# Patient Record
Sex: FEMALE | Race: WHITE | NOT HISPANIC OR LATINO | Employment: FULL TIME | URBAN - METROPOLITAN AREA
[De-identification: names, ages, dates, MRNs, and addresses within clinical notes are randomized per-mention and may not be internally consistent; named-entity substitution may affect disease eponyms.]

---

## 2023-03-10 LAB
ABO GROUP (TYPE) IN BLOOD: NORMAL
ANTIBODY SCREEN: NORMAL
RH FACTOR: NORMAL

## 2023-03-19 LAB — SARS-COV-2 RESULT: NOT DETECTED

## 2023-07-06 LAB
ABO GROUP (TYPE) IN BLOOD: NORMAL
ABO GROUP (TYPE) IN BLOOD: NORMAL
ANTIBODY SCREEN: NORMAL
ANTIBODY SCREEN: NORMAL
RH FACTOR: NORMAL
RH FACTOR: NORMAL

## 2023-07-11 ENCOUNTER — HOSPITAL ENCOUNTER (OUTPATIENT)
Dept: DATA CONVERSION | Facility: HOSPITAL | Age: 68
End: 2023-07-11
Attending: ORTHOPAEDIC SURGERY | Admitting: ORTHOPAEDIC SURGERY
Payer: COMMERCIAL

## 2023-07-11 DIAGNOSIS — S63.285A DISLOCATION OF PROXIMAL INTERPHALANGEAL JOINT OF LEFT RING FINGER, INITIAL ENCOUNTER: ICD-10-CM

## 2023-09-29 VITALS — HEIGHT: 68 IN | WEIGHT: 174.16 LBS | BODY MASS INDEX: 26.4 KG/M2

## 2023-10-02 NOTE — OP NOTE
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, S63.271A    Surgeon: Jeimy Romano  Resident/Fellow/Other Assistant: Lois Padgett    Procedure:  Left open reduction and pinning chronic proximal interphalangeal joint dislocation ring finger  Left central slip tenolysis ring finger  Left volar plate release and capsulotomy proximal interphalangeal joint ring finger    Anesthesia: Missael Ortega  Estimated Blood Loss: 0  Findings: Chronic contracture with contracture of soft tissues volar plate and tendons, degenerative changes articular surface proximal phalanx and middle phalanx  Specimens(s) Collected: no,     Complications: None  IV Fluids: 200cc  Implants: 0.045 K wire  Tourniquet Times: Utilized  Patient Returned To/Condition: Stable        Operative Report:   Clinical note:    Patient comes in as a 68-year-old she is unfortunately status post a chronic dislocation of the ring finger.  The patient had this occur back in November.  The patient never really failed to share that information with anybody.  In fact she went several  months and then given underwent a elective total hip.  Only in follow-up after the total hip did she mention that she had an issue with the ring finger and subsequently referred to me for definitive management.  Unfortunate the patient has a completely  dislocated and a shortened ring finger that is locked at the PIP joint.  Secondarily she is now locked literally at the DIP joint and at the MCP joint very little motion deformities appreciable the swelling is down.  I discussed options with her.  We  can proceed with our efforts to reduce this this will take considerable effort.  Unfortunately if we reduce it the patient may have arthritis stiffness and continued instability.  She understands  what to take down a significant amount of tissues in order  to successfully get it reduced.  The patient understands that down the line she may require further procedures which could include an arthrodesis or joint replacement.  The patient consents and is marked.    Operative note:    Patient taken the op room and anesthesia was administered.  The extremities prepped and draped in usual manner.  A final timeout is done with the  operative team.  The patient did hand have an antibiotic given.  Incision is carried out first volarly.  A Brijesh incision is carried out the flaps are meticulously elevated.  1 can appreciate prominence volarly.  Therefore preserving the pulley structures  portion of the cruciate is removed.  The volar plate is completely disrupted and the joint is practically exposed.  Unfortunately the area is extremely stiff and just releasing the volar plate was not enough to get it reduced.  Therefore dorsal incision  was then carried out.  Leaving the volar incision Ocean open the dorsal incision was carried out directly over the dislocation.  I did take down significant mount of scar tissue over the DIP joint once that was done I was able to identify the dorsal aspect  of the middle phalanx.  This is taken of the articular surface.  Unfortunately in this process 1 can appreciate the patient really has no significant cartilage it over the distal aspect of the proximal phalanx despite its contour looking okay the cartilage  completely was degenerative at and missing.  We turned our attention back fully once we released the central slip and the extensor tendons.  This allowed some mobility.  Through a dorsal and volar approach with all that released I was able to reduce the  joint.  Unfortunately once I release the joint the joint stayed relatively stable however clearly did not have a good articular surface.  We can appreciate this both clinically and radiographically.  Therefore she was pinned in  about 20 degrees let the  tissue scar in.  Our hope is therefore when the pin is removed we get her a little bit of motion via therapy although I am not very optimistic that will be painful or arthritic or require further intervention down the line.  The least this will maintain  the tissues the appropriate depth.  The DIP joint has not moved in about 8 months and therefore it was stretch without tearing the extensor tendon and part of the release dorsally needed that.  The tendons allowed to fall back into position volarly.   The volar plate was reconstructed as best possible distally and its attachment where was released.  Tendons freely flowed.  Wound was irrigated and both incisions were closed with nylon sutures.  Marcaine without epinephrine is infiltrated the areas for  postoperative pain relief.  The pin was cut and bent away from the skin for later retrieval.  Dressings include Xeroform fluffs web roll and a well-padded dressing.  Patient tolerated procedure well without complication.                        Attestation:   Note Completion:  Attending Attestation I performed the procedure without a resident         Electronic Signatures:  Jeimy Romano)  (Signed 11-Jul-2023 17:48)   Authored: Post-Operative Note, Chart Review, Note Completion      Last Updated: 11-Jul-2023 17:48 by Jeimy Romano)

## 2024-05-08 ENCOUNTER — LAB (OUTPATIENT)
Dept: LAB | Facility: LAB | Age: 69
End: 2024-05-08
Payer: MEDICARE

## 2024-05-08 ENCOUNTER — PRE-ADMISSION TESTING (OUTPATIENT)
Dept: PREADMISSION TESTING | Facility: HOSPITAL | Age: 69
End: 2024-05-08
Payer: MEDICARE

## 2024-05-08 VITALS — HEIGHT: 68 IN | WEIGHT: 181.88 LBS | BODY MASS INDEX: 27.57 KG/M2

## 2024-05-08 DIAGNOSIS — Z01.818 PRE-OP TESTING: ICD-10-CM

## 2024-05-08 DIAGNOSIS — Z01.818 PRE-OP TESTING: Primary | ICD-10-CM

## 2024-05-08 DIAGNOSIS — R73.9 TRANSIENT HYPERGLYCEMIA POST PROCEDURE: ICD-10-CM

## 2024-05-08 LAB
ABO GROUP (TYPE) IN BLOOD: NORMAL
ANTIBODY SCREEN: NORMAL
EST. AVERAGE GLUCOSE BLD GHB EST-MCNC: 111 MG/DL
HBA1C MFR BLD: 5.5 %
RH FACTOR (ANTIGEN D): NORMAL

## 2024-05-08 PROCEDURE — 86900 BLOOD TYPING SEROLOGIC ABO: CPT

## 2024-05-08 PROCEDURE — 83036 HEMOGLOBIN GLYCOSYLATED A1C: CPT

## 2024-05-08 PROCEDURE — 86901 BLOOD TYPING SEROLOGIC RH(D): CPT

## 2024-05-08 PROCEDURE — 87081 CULTURE SCREEN ONLY: CPT | Mod: STJLAB | Performed by: ORTHOPAEDIC SURGERY

## 2024-05-08 PROCEDURE — 36415 COLL VENOUS BLD VENIPUNCTURE: CPT

## 2024-05-08 PROCEDURE — 86850 RBC ANTIBODY SCREEN: CPT

## 2024-05-08 RX ORDER — GLUCOSAMINE/CHONDRO SU A 500-400 MG
1 TABLET ORAL 2 TIMES DAILY
COMMUNITY
End: 2024-05-23 | Stop reason: HOSPADM

## 2024-05-08 RX ORDER — BISMUTH SUBSALICYLATE 262 MG
1 TABLET,CHEWABLE ORAL DAILY
COMMUNITY

## 2024-05-08 RX ORDER — CHLORHEXIDINE GLUCONATE ORAL RINSE 1.2 MG/ML
SOLUTION DENTAL
Qty: 473 ML | Refills: 0 | Status: SHIPPED | OUTPATIENT
Start: 2024-05-08 | End: 2024-05-23 | Stop reason: HOSPADM

## 2024-05-08 RX ORDER — MELOXICAM 15 MG/1
15 TABLET ORAL DAILY PRN
COMMUNITY
End: 2024-05-23 | Stop reason: HOSPADM

## 2024-05-08 NOTE — PREPROCEDURE INSTRUCTIONS
Medication List            Accurate as of May 8, 2024  9:22 AM. Always use your most recent med list.                chlorhexidine 0.12 % solution  Commonly known as: Peridex  Sig: 15 mls swish and spit the night before surgery and 15mls swish and spit the morning of surgery do not swallow  Notes to patient: Take as instructed     COLLAGEN SKIN RENEWAL ORAL  Medication Adjustments for Surgery: Stop 7 days before surgery     glucosamine-chondroitin 500-400 mg tablet  Medication Adjustments for Surgery: Stop 7 days before surgery     LUTEIN ORAL  Medication Adjustments for Surgery: Stop 7 days before surgery     meloxicam 15 mg tablet  Commonly known as: Mobic  Medication Adjustments for Surgery: Stop 7 days before surgery     multivitamin tablet  Medication Adjustments for Surgery: Stop 7 days before surgery     PRESERVISION AREDS-2 ORAL  Medication Adjustments for Surgery: Stop 7 days before surgery                                  PRE-OPERATIVE INSTRUCTIONS    You will receive notification one business day prior to your procedure to confirm your arrival time. It is important that you answer your phone and/or check your messages during this time. If you do not hear from the surgery center by 5 pm. the day before your procedure, please call [phone].     Please enter the building through the Outpatient entrance and take the elevator off the lobby to the 2nd floor then check in at the Outpatient Surgery desk on the 2nd floor.    INSTRUCTIONS:  Talk to your surgeon for instructions if you should stop your aspirin, blood thinner, or diabetes medicines.  DO NOT take any multivitamins or over the counter supplements for 7-10 days before surgery.  If not being admitted, you must have an adult immediately available to drive you home after surgery. We also highly recommend you have someone stay with you for the entire day and night of your surgery.  For children having surgery, a parent or legal guardian must  accompany them to the surgery center. If this is not possible, please call [phone] to make additional arrangements.  For adults who are unable to consent or make medical decisions for themselves, a legal guardian or Power of  must accompany them to the surgery center. If this is not possible, please call [phone] to make additional arrangements.  Wear comfortable, loose fitting clothing.  All jewelry and piercings must be removed. If you are unable to remove an item or have a dermal piercing, please be sure to tell the nurse when you arrive for surgery.  Nail polish and make-up must be removed.  Avoid smoking or consuming alcohol for 24 hours before surgery.  To help prevent infection, please take a shower/bath and wash your hair the night before and/or morning of surgery (or follow other specific bathing instructions provided).    Preoperative Fasting Guidelines    Why must I stop eating and drinking near surgery time?  With sedation, food or liquid in your stomach can enter your lungs causing serious complications  Increases nausea and vomiting    When do I need to stop eating and drinking before my surgery?  Do not eat any solid food after midnight the night before your surgery/procedure unless otherwise instructed by your surgeon.   You may have up to 13.5 ounces of clear liquid until TWO hours before your instructed arrival time to the hospital.  This includes water, black tea/coffee, (no milk or cream) apple juice, and electrolyte drinks (Gatorade).   You may chew gum until TWO hours before your surgery/procedure      If applicable, notify your surgeons office immediately of any new skin changes that occur to the surgical limb.      If you have any questions or concerns, please call Pre-Admission Testing at [phone].

## 2024-05-10 LAB — STAPHYLOCOCCUS SPEC CULT: NORMAL

## 2024-05-22 ENCOUNTER — ANESTHESIA EVENT (OUTPATIENT)
Dept: OPERATING ROOM | Facility: HOSPITAL | Age: 69
End: 2024-05-22
Payer: MEDICARE

## 2024-05-22 ENCOUNTER — ANESTHESIA (OUTPATIENT)
Dept: OPERATING ROOM | Facility: HOSPITAL | Age: 69
End: 2024-05-22
Payer: MEDICARE

## 2024-05-22 ENCOUNTER — HOSPITAL ENCOUNTER (OUTPATIENT)
Facility: HOSPITAL | Age: 69
Discharge: HOME | End: 2024-05-23
Attending: ORTHOPAEDIC SURGERY | Admitting: ORTHOPAEDIC SURGERY
Payer: MEDICARE

## 2024-05-22 ENCOUNTER — APPOINTMENT (OUTPATIENT)
Dept: RADIOLOGY | Facility: HOSPITAL | Age: 69
End: 2024-05-22
Payer: MEDICARE

## 2024-05-22 DIAGNOSIS — Z96.641 STATUS POST RIGHT HIP REPLACEMENT: Primary | ICD-10-CM

## 2024-05-22 LAB
ABO GROUP (TYPE) IN BLOOD: NORMAL
ANTIBODY SCREEN: NORMAL
RH FACTOR (ANTIGEN D): NORMAL

## 2024-05-22 PROCEDURE — 7100000011 HC EXTENDED STAY RECOVERY HOURLY - NURSING UNIT

## 2024-05-22 PROCEDURE — 7100000002 HC RECOVERY ROOM TIME - EACH INCREMENTAL 1 MINUTE: Performed by: ORTHOPAEDIC SURGERY

## 2024-05-22 PROCEDURE — C1776 JOINT DEVICE (IMPLANTABLE): HCPCS | Performed by: ORTHOPAEDIC SURGERY

## 2024-05-22 PROCEDURE — 3700000002 HC GENERAL ANESTHESIA TIME - EACH INCREMENTAL 1 MINUTE: Performed by: ORTHOPAEDIC SURGERY

## 2024-05-22 PROCEDURE — 2500000001 HC RX 250 WO HCPCS SELF ADMINISTERED DRUGS (ALT 637 FOR MEDICARE OP): Performed by: PHYSICIAN ASSISTANT

## 2024-05-22 PROCEDURE — 2500000004 HC RX 250 GENERAL PHARMACY W/ HCPCS (ALT 636 FOR OP/ED): Mod: JZ | Performed by: ORTHOPAEDIC SURGERY

## 2024-05-22 PROCEDURE — 3700000001 HC GENERAL ANESTHESIA TIME - INITIAL BASE CHARGE: Performed by: ORTHOPAEDIC SURGERY

## 2024-05-22 PROCEDURE — A27130 PR TOTAL HIP ARTHROPLASTY: Performed by: ANESTHESIOLOGIST ASSISTANT

## 2024-05-22 PROCEDURE — 3600000018 HC OR TIME - INITIAL BASE CHARGE - PROCEDURE LEVEL SIX: Performed by: ORTHOPAEDIC SURGERY

## 2024-05-22 PROCEDURE — 72170 X-RAY EXAM OF PELVIS: CPT | Performed by: RADIOLOGY

## 2024-05-22 PROCEDURE — 2500000005 HC RX 250 GENERAL PHARMACY W/O HCPCS: Performed by: ANESTHESIOLOGIST ASSISTANT

## 2024-05-22 PROCEDURE — 3600000017 HC OR TIME - EACH INCREMENTAL 1 MINUTE - PROCEDURE LEVEL SIX: Performed by: ORTHOPAEDIC SURGERY

## 2024-05-22 PROCEDURE — 2500000004 HC RX 250 GENERAL PHARMACY W/ HCPCS (ALT 636 FOR OP/ED): Performed by: ANESTHESIOLOGIST ASSISTANT

## 2024-05-22 PROCEDURE — 2500000005 HC RX 250 GENERAL PHARMACY W/O HCPCS: Performed by: ANESTHESIOLOGY

## 2024-05-22 PROCEDURE — 7100000001 HC RECOVERY ROOM TIME - INITIAL BASE CHARGE: Performed by: ORTHOPAEDIC SURGERY

## 2024-05-22 PROCEDURE — 72170 X-RAY EXAM OF PELVIS: CPT

## 2024-05-22 PROCEDURE — 97166 OT EVAL MOD COMPLEX 45 MIN: CPT | Mod: GO | Performed by: OCCUPATIONAL THERAPIST

## 2024-05-22 PROCEDURE — C1713 ANCHOR/SCREW BN/BN,TIS/BN: HCPCS | Performed by: ORTHOPAEDIC SURGERY

## 2024-05-22 PROCEDURE — 2500000001 HC RX 250 WO HCPCS SELF ADMINISTERED DRUGS (ALT 637 FOR MEDICARE OP): Performed by: ORTHOPAEDIC SURGERY

## 2024-05-22 PROCEDURE — G0378 HOSPITAL OBSERVATION PER HR: HCPCS

## 2024-05-22 PROCEDURE — 2500000006 HC RX 250 W HCPCS SELF ADMINISTERED DRUGS (ALT 637 FOR ALL PAYERS): Performed by: ORTHOPAEDIC SURGERY

## 2024-05-22 PROCEDURE — A6213 FOAM DRG >16<=48 SQ IN W/BDR: HCPCS | Performed by: ORTHOPAEDIC SURGERY

## 2024-05-22 PROCEDURE — 36415 COLL VENOUS BLD VENIPUNCTURE: CPT | Performed by: ORTHOPAEDIC SURGERY

## 2024-05-22 PROCEDURE — 2500000004 HC RX 250 GENERAL PHARMACY W/ HCPCS (ALT 636 FOR OP/ED): Performed by: ORTHOPAEDIC SURGERY

## 2024-05-22 PROCEDURE — 97535 SELF CARE MNGMENT TRAINING: CPT | Mod: GO | Performed by: OCCUPATIONAL THERAPIST

## 2024-05-22 PROCEDURE — 2500000004 HC RX 250 GENERAL PHARMACY W/ HCPCS (ALT 636 FOR OP/ED): Performed by: ANESTHESIOLOGY

## 2024-05-22 PROCEDURE — A9999 DME SUPPLY OR ACCESSORY, NOS: HCPCS | Performed by: ORTHOPAEDIC SURGERY

## 2024-05-22 PROCEDURE — 86901 BLOOD TYPING SEROLOGIC RH(D): CPT | Performed by: ORTHOPAEDIC SURGERY

## 2024-05-22 PROCEDURE — 99100 ANES PT EXTEME AGE<1 YR&>70: CPT | Performed by: ANESTHESIOLOGY

## 2024-05-22 PROCEDURE — A27130 PR TOTAL HIP ARTHROPLASTY: Performed by: ANESTHESIOLOGY

## 2024-05-22 PROCEDURE — 2720000007 HC OR 272 NO HCPCS: Performed by: ORTHOPAEDIC SURGERY

## 2024-05-22 PROCEDURE — 97116 GAIT TRAINING THERAPY: CPT | Mod: GP

## 2024-05-22 PROCEDURE — 97161 PT EVAL LOW COMPLEX 20 MIN: CPT | Mod: GP

## 2024-05-22 PROCEDURE — 2780000003 HC OR 278 NO HCPCS: Performed by: ORTHOPAEDIC SURGERY

## 2024-05-22 DEVICE — 127 DEGREE NECK ANGLE HIP STEM
Type: IMPLANTABLE DEVICE | Site: HIP | Status: FUNCTIONAL
Brand: ACCOLADE

## 2024-05-22 DEVICE — CERAMIC V40 FEMORAL HEAD
Type: IMPLANTABLE DEVICE | Site: HIP | Status: FUNCTIONAL
Brand: BIOLOX

## 2024-05-22 DEVICE — 6.5MM LOW PROFILE HEX SCREW 30MM
Type: IMPLANTABLE DEVICE | Site: HIP | Status: FUNCTIONAL
Brand: TRIDENT II

## 2024-05-22 DEVICE — TRIDENT II TRITANIUM CLUSTER 52E
Type: IMPLANTABLE DEVICE | Site: HIP | Status: FUNCTIONAL
Brand: TRIDENT II

## 2024-05-22 DEVICE — TRIDENT X3 0 DEGREE POLYETHYLENE INSERT
Type: IMPLANTABLE DEVICE | Site: HIP | Status: FUNCTIONAL
Brand: TRIDENT X3 INSERT

## 2024-05-22 RX ORDER — MIDAZOLAM HYDROCHLORIDE 1 MG/ML
INJECTION, SOLUTION INTRAMUSCULAR; INTRAVENOUS AS NEEDED
Status: DISCONTINUED | OUTPATIENT
Start: 2024-05-22 | End: 2024-05-22

## 2024-05-22 RX ORDER — OXYCODONE HYDROCHLORIDE 5 MG/1
5 TABLET ORAL ONCE
Status: COMPLETED | OUTPATIENT
Start: 2024-05-22 | End: 2024-05-22

## 2024-05-22 RX ORDER — GLUCOSAMINE/CHONDRO SU A 500-400 MG
1 TABLET ORAL 2 TIMES DAILY
Status: DISCONTINUED | OUTPATIENT
Start: 2024-05-22 | End: 2024-05-22

## 2024-05-22 RX ORDER — OXYCODONE HYDROCHLORIDE 10 MG/1
10 TABLET ORAL EVERY 4 HOURS PRN
Status: DISCONTINUED | OUTPATIENT
Start: 2024-05-22 | End: 2024-05-23

## 2024-05-22 RX ORDER — NALOXONE HYDROCHLORIDE 0.4 MG/ML
0.2 INJECTION, SOLUTION INTRAMUSCULAR; INTRAVENOUS; SUBCUTANEOUS EVERY 5 MIN PRN
Status: DISCONTINUED | OUTPATIENT
Start: 2024-05-22 | End: 2024-05-23 | Stop reason: HOSPADM

## 2024-05-22 RX ORDER — CEFAZOLIN SODIUM 2 G/100ML
2 INJECTION, SOLUTION INTRAVENOUS ONCE
Status: COMPLETED | OUTPATIENT
Start: 2024-05-22 | End: 2024-05-22

## 2024-05-22 RX ORDER — HYDRALAZINE HYDROCHLORIDE 20 MG/ML
5 INJECTION INTRAMUSCULAR; INTRAVENOUS EVERY 30 MIN PRN
Status: DISCONTINUED | OUTPATIENT
Start: 2024-05-22 | End: 2024-05-22 | Stop reason: HOSPADM

## 2024-05-22 RX ORDER — ASPIRIN 81 MG/1
81 TABLET ORAL 2 TIMES DAILY
Status: DISCONTINUED | OUTPATIENT
Start: 2024-05-22 | End: 2024-05-23 | Stop reason: HOSPADM

## 2024-05-22 RX ORDER — OXYCODONE HYDROCHLORIDE 5 MG/1
5 TABLET ORAL EVERY 6 HOURS PRN
Status: DISCONTINUED | OUTPATIENT
Start: 2024-05-22 | End: 2024-05-22

## 2024-05-22 RX ORDER — PROPOFOL 10 MG/ML
INJECTION, EMULSION INTRAVENOUS AS NEEDED
Status: DISCONTINUED | OUTPATIENT
Start: 2024-05-22 | End: 2024-05-22

## 2024-05-22 RX ORDER — NORETHINDRONE AND ETHINYL ESTRADIOL 0.5-0.035
KIT ORAL AS NEEDED
Status: DISCONTINUED | OUTPATIENT
Start: 2024-05-22 | End: 2024-05-22

## 2024-05-22 RX ORDER — CEFAZOLIN SODIUM 2 G/100ML
2 INJECTION, SOLUTION INTRAVENOUS EVERY 8 HOURS
Status: COMPLETED | OUTPATIENT
Start: 2024-05-22 | End: 2024-05-23

## 2024-05-22 RX ORDER — MIDAZOLAM HYDROCHLORIDE 1 MG/ML
1 INJECTION, SOLUTION INTRAMUSCULAR; INTRAVENOUS ONCE AS NEEDED
Status: DISCONTINUED | OUTPATIENT
Start: 2024-05-22 | End: 2024-05-22 | Stop reason: HOSPADM

## 2024-05-22 RX ORDER — FERROUS SULFATE 325(65) MG
65 TABLET ORAL DAILY
Status: DISCONTINUED | OUTPATIENT
Start: 2024-05-22 | End: 2024-05-23 | Stop reason: HOSPADM

## 2024-05-22 RX ORDER — HYDROCODONE BITARTRATE AND ACETAMINOPHEN 5; 325 MG/1; MG/1
1 TABLET ORAL EVERY 4 HOURS PRN
Status: DISCONTINUED | OUTPATIENT
Start: 2024-05-22 | End: 2024-05-22 | Stop reason: HOSPADM

## 2024-05-22 RX ORDER — ONDANSETRON HYDROCHLORIDE 2 MG/ML
4 INJECTION, SOLUTION INTRAVENOUS ONCE AS NEEDED
Status: DISCONTINUED | OUTPATIENT
Start: 2024-05-22 | End: 2024-05-22 | Stop reason: HOSPADM

## 2024-05-22 RX ORDER — CYCLOBENZAPRINE HCL 10 MG
10 TABLET ORAL 3 TIMES DAILY PRN
Status: DISCONTINUED | OUTPATIENT
Start: 2024-05-22 | End: 2024-05-23 | Stop reason: HOSPADM

## 2024-05-22 RX ORDER — FENTANYL CITRATE 50 UG/ML
INJECTION, SOLUTION INTRAMUSCULAR; INTRAVENOUS AS NEEDED
Status: DISCONTINUED | OUTPATIENT
Start: 2024-05-22 | End: 2024-05-22

## 2024-05-22 RX ORDER — SODIUM CHLORIDE, SODIUM LACTATE, POTASSIUM CHLORIDE, CALCIUM CHLORIDE 600; 310; 30; 20 MG/100ML; MG/100ML; MG/100ML; MG/100ML
100 INJECTION, SOLUTION INTRAVENOUS CONTINUOUS
Status: DISCONTINUED | OUTPATIENT
Start: 2024-05-22 | End: 2024-05-23 | Stop reason: HOSPADM

## 2024-05-22 RX ORDER — PHENYLEPHRINE HCL IN 0.9% NACL 1 MG/10 ML
SYRINGE (ML) INTRAVENOUS AS NEEDED
Status: DISCONTINUED | OUTPATIENT
Start: 2024-05-22 | End: 2024-05-22

## 2024-05-22 RX ORDER — SODIUM CHLORIDE, SODIUM LACTATE, POTASSIUM CHLORIDE, CALCIUM CHLORIDE 600; 310; 30; 20 MG/100ML; MG/100ML; MG/100ML; MG/100ML
100 INJECTION, SOLUTION INTRAVENOUS CONTINUOUS
Status: ACTIVE | OUTPATIENT
Start: 2024-05-22 | End: 2024-05-23

## 2024-05-22 RX ORDER — ALBUTEROL SULFATE 0.83 MG/ML
2.5 SOLUTION RESPIRATORY (INHALATION) ONCE AS NEEDED
Status: DISCONTINUED | OUTPATIENT
Start: 2024-05-22 | End: 2024-05-22 | Stop reason: HOSPADM

## 2024-05-22 RX ORDER — OXYCODONE HYDROCHLORIDE 10 MG/1
10 TABLET ORAL EVERY 4 HOURS PRN
Status: DISCONTINUED | OUTPATIENT
Start: 2024-05-22 | End: 2024-05-22 | Stop reason: HOSPADM

## 2024-05-22 RX ORDER — DIPHENHYDRAMINE HYDROCHLORIDE 50 MG/ML
12.5 INJECTION INTRAMUSCULAR; INTRAVENOUS EVERY 6 HOURS PRN
Status: DISCONTINUED | OUTPATIENT
Start: 2024-05-22 | End: 2024-05-23 | Stop reason: HOSPADM

## 2024-05-22 RX ORDER — ACETAMINOPHEN 325 MG/1
975 TABLET ORAL ONCE
Status: DISCONTINUED | OUTPATIENT
Start: 2024-05-22 | End: 2024-05-22 | Stop reason: HOSPADM

## 2024-05-22 RX ORDER — MORPHINE SULFATE 2 MG/ML
2 INJECTION, SOLUTION INTRAMUSCULAR; INTRAVENOUS EVERY 4 HOURS PRN
Status: DISCONTINUED | OUTPATIENT
Start: 2024-05-22 | End: 2024-05-23 | Stop reason: HOSPADM

## 2024-05-22 RX ORDER — MELOXICAM 15 MG/1
15 TABLET ORAL DAILY PRN
Status: DISCONTINUED | OUTPATIENT
Start: 2024-05-22 | End: 2024-05-23 | Stop reason: HOSPADM

## 2024-05-22 RX ORDER — MULTIVIT-MIN/IRON FUM/FOLIC AC 7.5 MG-4
1 TABLET ORAL DAILY
Status: DISCONTINUED | OUTPATIENT
Start: 2024-05-22 | End: 2024-05-23 | Stop reason: HOSPADM

## 2024-05-22 RX ORDER — TRANEXAMIC ACID 650 MG/1
1950 TABLET ORAL ONCE
Status: COMPLETED | OUTPATIENT
Start: 2024-05-22 | End: 2024-05-22

## 2024-05-22 RX ORDER — PHENYLEPHRINE 10 MG/250 ML(40 MCG/ML)IN 0.9 % SOD.CHLORIDE INTRAVENOUS
CONTINUOUS PRN
Status: DISCONTINUED | OUTPATIENT
Start: 2024-05-22 | End: 2024-05-22

## 2024-05-22 RX ORDER — SODIUM CHLORIDE, SODIUM LACTATE, POTASSIUM CHLORIDE, CALCIUM CHLORIDE 600; 310; 30; 20 MG/100ML; MG/100ML; MG/100ML; MG/100ML
100 INJECTION, SOLUTION INTRAVENOUS CONTINUOUS
Status: DISCONTINUED | OUTPATIENT
Start: 2024-05-22 | End: 2024-05-22 | Stop reason: HOSPADM

## 2024-05-22 RX ORDER — OXYCODONE HYDROCHLORIDE 5 MG/1
5 TABLET ORAL EVERY 4 HOURS PRN
Status: DISCONTINUED | OUTPATIENT
Start: 2024-05-22 | End: 2024-05-23

## 2024-05-22 RX ORDER — ACETAMINOPHEN 325 MG/1
650 TABLET ORAL EVERY 4 HOURS PRN
Status: DISCONTINUED | OUTPATIENT
Start: 2024-05-22 | End: 2024-05-22 | Stop reason: HOSPADM

## 2024-05-22 RX ORDER — POLYETHYLENE GLYCOL 3350 17 G/17G
17 POWDER, FOR SOLUTION ORAL DAILY
Status: DISCONTINUED | OUTPATIENT
Start: 2024-05-22 | End: 2024-05-23 | Stop reason: HOSPADM

## 2024-05-22 RX ADMIN — FENTANYL CITRATE 25 MCG: 50 INJECTION, SOLUTION INTRAMUSCULAR; INTRAVENOUS at 08:23

## 2024-05-22 RX ADMIN — SODIUM CHLORIDE, POTASSIUM CHLORIDE, SODIUM LACTATE AND CALCIUM CHLORIDE 100 ML/HR: 600; 310; 30; 20 INJECTION, SOLUTION INTRAVENOUS at 12:08

## 2024-05-22 RX ADMIN — OXYCODONE HYDROCHLORIDE 5 MG: 5 TABLET ORAL at 12:28

## 2024-05-22 RX ADMIN — FENTANYL CITRATE 25 MCG: 50 INJECTION, SOLUTION INTRAMUSCULAR; INTRAVENOUS at 10:17

## 2024-05-22 RX ADMIN — OXYCODONE HYDROCHLORIDE 10 MG: 10 TABLET ORAL at 16:29

## 2024-05-22 RX ADMIN — ASPIRIN 81 MG: 81 TABLET, COATED ORAL at 13:37

## 2024-05-22 RX ADMIN — HYDROMORPHONE HYDROCHLORIDE 0.5 MG: 1 INJECTION, SOLUTION INTRAMUSCULAR; INTRAVENOUS; SUBCUTANEOUS at 10:32

## 2024-05-22 RX ADMIN — ASPIRIN 81 MG: 81 TABLET, COATED ORAL at 20:31

## 2024-05-22 RX ADMIN — PROPOFOL 200 MG: 10 INJECTION, EMULSION INTRAVENOUS at 09:40

## 2024-05-22 RX ADMIN — SODIUM CHLORIDE, POTASSIUM CHLORIDE, SODIUM LACTATE AND CALCIUM CHLORIDE 100 ML/HR: 600; 310; 30; 20 INJECTION, SOLUTION INTRAVENOUS at 07:27

## 2024-05-22 RX ADMIN — OXYCODONE HYDROCHLORIDE 5 MG: 5 TABLET ORAL at 13:37

## 2024-05-22 RX ADMIN — CEFAZOLIN SODIUM 2 G: 2 INJECTION, SOLUTION INTRAVENOUS at 16:29

## 2024-05-22 RX ADMIN — FERROUS SULFATE TAB 325 MG (65 MG ELEMENTAL FE) 1 TABLET: 325 (65 FE) TAB at 13:37

## 2024-05-22 RX ADMIN — PROPOFOL 500 MG: 10 INJECTION, EMULSION INTRAVENOUS at 08:00

## 2024-05-22 RX ADMIN — EPHEDRINE SULFATE 10 MG: 50 INJECTION, SOLUTION INTRAVENOUS at 09:23

## 2024-05-22 RX ADMIN — SODIUM CHLORIDE, POTASSIUM CHLORIDE, SODIUM LACTATE AND CALCIUM CHLORIDE 100 ML/HR: 600; 310; 30; 20 INJECTION, SOLUTION INTRAVENOUS at 22:28

## 2024-05-22 RX ADMIN — SODIUM CHLORIDE, SODIUM LACTATE, POTASSIUM CHLORIDE, AND CALCIUM CHLORIDE: 600; 310; 30; 20 INJECTION, SOLUTION INTRAVENOUS at 07:48

## 2024-05-22 RX ADMIN — SODIUM CHLORIDE, SODIUM LACTATE, POTASSIUM CHLORIDE, AND CALCIUM CHLORIDE: 600; 310; 30; 20 INJECTION, SOLUTION INTRAVENOUS at 09:12

## 2024-05-22 RX ADMIN — FENTANYL CITRATE 25 MCG: 50 INJECTION, SOLUTION INTRAMUSCULAR; INTRAVENOUS at 08:45

## 2024-05-22 RX ADMIN — FENTANYL CITRATE 25 MCG: 50 INJECTION, SOLUTION INTRAMUSCULAR; INTRAVENOUS at 08:19

## 2024-05-22 RX ADMIN — HYDROMORPHONE HYDROCHLORIDE 0.1 MG: 0.2 INJECTION, SOLUTION INTRAMUSCULAR; INTRAVENOUS; SUBCUTANEOUS at 11:06

## 2024-05-22 RX ADMIN — CEFAZOLIN SODIUM 2 G: 2 INJECTION, SOLUTION INTRAVENOUS at 08:00

## 2024-05-22 RX ADMIN — Medication 8 L/MIN: at 10:15

## 2024-05-22 RX ADMIN — PROPOFOL 500 MG: 10 INJECTION, EMULSION INTRAVENOUS at 08:40

## 2024-05-22 RX ADMIN — Medication 0.25 MCG/KG/MIN: at 09:19

## 2024-05-22 RX ADMIN — Medication 1 TABLET: at 13:37

## 2024-05-22 RX ADMIN — OXYCODONE HYDROCHLORIDE 10 MG: 10 TABLET ORAL at 20:32

## 2024-05-22 RX ADMIN — TRANEXAMIC ACID 1950 MG: 650 TABLET ORAL at 07:27

## 2024-05-22 RX ADMIN — MIDAZOLAM 2 MG: 1 INJECTION INTRAMUSCULAR; INTRAVENOUS at 07:48

## 2024-05-22 RX ADMIN — HYDROMORPHONE HYDROCHLORIDE 0.5 MG: 1 INJECTION, SOLUTION INTRAMUSCULAR; INTRAVENOUS; SUBCUTANEOUS at 10:19

## 2024-05-22 RX ADMIN — Medication 150 MCG: at 09:19

## 2024-05-22 SDOH — SOCIAL STABILITY: SOCIAL INSECURITY: HAVE YOU HAD ANY THOUGHTS OF HARMING ANYONE ELSE?: NO

## 2024-05-22 SDOH — SOCIAL STABILITY: SOCIAL INSECURITY: HAS ANYONE EVER THREATENED TO HURT YOUR FAMILY OR YOUR PETS?: NO

## 2024-05-22 SDOH — SOCIAL STABILITY: SOCIAL INSECURITY: DO YOU FEEL UNSAFE GOING BACK TO THE PLACE WHERE YOU ARE LIVING?: NO

## 2024-05-22 SDOH — SOCIAL STABILITY: SOCIAL INSECURITY: ARE THERE ANY APPARENT SIGNS OF INJURIES/BEHAVIORS THAT COULD BE RELATED TO ABUSE/NEGLECT?: NO

## 2024-05-22 SDOH — SOCIAL STABILITY: SOCIAL INSECURITY: HAVE YOU HAD THOUGHTS OF HARMING ANYONE ELSE?: YES

## 2024-05-22 SDOH — SOCIAL STABILITY: SOCIAL INSECURITY: DOES ANYONE TRY TO KEEP YOU FROM HAVING/CONTACTING OTHER FRIENDS OR DOING THINGS OUTSIDE YOUR HOME?: NO

## 2024-05-22 SDOH — SOCIAL STABILITY: SOCIAL INSECURITY: WERE YOU ABLE TO COMPLETE ALL THE BEHAVIORAL HEALTH SCREENINGS?: YES

## 2024-05-22 SDOH — HEALTH STABILITY: MENTAL HEALTH: CURRENT SMOKER: 0

## 2024-05-22 SDOH — SOCIAL STABILITY: SOCIAL INSECURITY: DO YOU FEEL ANYONE HAS EXPLOITED OR TAKEN ADVANTAGE OF YOU FINANCIALLY OR OF YOUR PERSONAL PROPERTY?: NO

## 2024-05-22 SDOH — SOCIAL STABILITY: SOCIAL INSECURITY: ABUSE: ADULT

## 2024-05-22 SDOH — SOCIAL STABILITY: SOCIAL INSECURITY: ARE YOU OR HAVE YOU BEEN THREATENED OR ABUSED PHYSICALLY, EMOTIONALLY, OR SEXUALLY BY ANYONE?: NO

## 2024-05-22 ASSESSMENT — PAIN - FUNCTIONAL ASSESSMENT

## 2024-05-22 ASSESSMENT — ACTIVITIES OF DAILY LIVING (ADL)
JUDGMENT_ADEQUATE_SAFELY_COMPLETE_DAILY_ACTIVITIES: YES
TOILETING: INDEPENDENT
PATIENT'S MEMORY ADEQUATE TO SAFELY COMPLETE DAILY ACTIVITIES?: YES
HEARING - LEFT EAR: FUNCTIONAL
ADL_ASSISTANCE: INDEPENDENT
ADEQUATE_TO_COMPLETE_ADL: YES
HEARING - RIGHT EAR: FUNCTIONAL
FEEDING YOURSELF: INDEPENDENT
BATHING: INDEPENDENT
LACK_OF_TRANSPORTATION: NO
DRESSING YOURSELF: INDEPENDENT
GROOMING: INDEPENDENT
HOME_MANAGEMENT_TIME_ENTRY: 13
WALKS IN HOME: INDEPENDENT

## 2024-05-22 ASSESSMENT — COGNITIVE AND FUNCTIONAL STATUS - GENERAL
CLIMB 3 TO 5 STEPS WITH RAILING: A LITTLE
HELP NEEDED FOR BATHING: A LOT
CLIMB 3 TO 5 STEPS WITH RAILING: A LITTLE
PATIENT BASELINE BEDBOUND: NO
DRESSING REGULAR LOWER BODY CLOTHING: A LITTLE
MOVING FROM LYING ON BACK TO SITTING ON SIDE OF FLAT BED WITH BEDRAILS: A LITTLE
TURNING FROM BACK TO SIDE WHILE IN FLAT BAD: A LITTLE
DRESSING REGULAR LOWER BODY CLOTHING: A LOT
DAILY ACTIVITIY SCORE: 24
DAILY ACTIVITIY SCORE: 19
MOBILITY SCORE: 18
MOVING TO AND FROM BED TO CHAIR: A LITTLE
WALKING IN HOSPITAL ROOM: A LITTLE
STANDING UP FROM CHAIR USING ARMS: A LITTLE
TOILETING: A LITTLE
MOBILITY SCORE: 24
TOILETING: A LITTLE
DAILY ACTIVITIY SCORE: 22
MOBILITY SCORE: 19
WALKING IN HOSPITAL ROOM: A LITTLE
MOVING TO AND FROM BED TO CHAIR: A LITTLE
TURNING FROM BACK TO SIDE WHILE IN FLAT BAD: A LITTLE
STANDING UP FROM CHAIR USING ARMS: A LITTLE

## 2024-05-22 ASSESSMENT — PAIN SCALES - GENERAL
PAINLEVEL_OUTOF10: 3
PAINLEVEL_OUTOF10: 5 - MODERATE PAIN
PAINLEVEL_OUTOF10: 5 - MODERATE PAIN
PAINLEVEL_OUTOF10: 3
PAINLEVEL_OUTOF10: 9
PAIN_LEVEL: 0
PAINLEVEL_OUTOF10: 4
PAINLEVEL_OUTOF10: 9
PAINLEVEL_OUTOF10: 5 - MODERATE PAIN
PAINLEVEL_OUTOF10: 10 - WORST POSSIBLE PAIN
PAINLEVEL_OUTOF10: 4
PAINLEVEL_OUTOF10: 2
PAINLEVEL_OUTOF10: 8
PAINLEVEL_OUTOF10: 9
PAINLEVEL_OUTOF10: 9
PAINLEVEL_OUTOF10: 8
PAINLEVEL_OUTOF10: 3
PAINLEVEL_OUTOF10: 2

## 2024-05-22 ASSESSMENT — PAIN DESCRIPTION - LOCATION
LOCATION: HIP

## 2024-05-22 ASSESSMENT — PAIN DESCRIPTION - ORIENTATION
ORIENTATION: RIGHT

## 2024-05-22 ASSESSMENT — LIFESTYLE VARIABLES
HOW OFTEN DO YOU HAVE 6 OR MORE DRINKS ON ONE OCCASION: NEVER
AUDIT-C TOTAL SCORE: 2
HOW MANY STANDARD DRINKS CONTAINING ALCOHOL DO YOU HAVE ON A TYPICAL DAY: 1 OR 2
SKIP TO QUESTIONS 9-10: 1
AUDIT-C TOTAL SCORE: 2
HOW OFTEN DO YOU HAVE A DRINK CONTAINING ALCOHOL: 2-4 TIMES A MONTH

## 2024-05-22 ASSESSMENT — PAIN SCALES - WONG BAKER: WONGBAKER_NUMERICALRESPONSE: HURTS LITTLE BIT

## 2024-05-22 ASSESSMENT — PATIENT HEALTH QUESTIONNAIRE - PHQ9
1. LITTLE INTEREST OR PLEASURE IN DOING THINGS: NOT AT ALL
2. FEELING DOWN, DEPRESSED OR HOPELESS: NOT AT ALL
SUM OF ALL RESPONSES TO PHQ9 QUESTIONS 1 & 2: 0

## 2024-05-22 ASSESSMENT — COLUMBIA-SUICIDE SEVERITY RATING SCALE - C-SSRS
1. IN THE PAST MONTH, HAVE YOU WISHED YOU WERE DEAD OR WISHED YOU COULD GO TO SLEEP AND NOT WAKE UP?: NO
2. HAVE YOU ACTUALLY HAD ANY THOUGHTS OF KILLING YOURSELF?: NO
6. HAVE YOU EVER DONE ANYTHING, STARTED TO DO ANYTHING, OR PREPARED TO DO ANYTHING TO END YOUR LIFE?: NO

## 2024-05-22 NOTE — OP NOTE
RIGHT TOTAL HIP REPLACEMENT (R) Operative Note     Date: 2024  OR Location: [facility] OR    Name: Vanna Bhakta, : 1955, Age: 69 y.o., MRN: [id number], Sex: female    Diagnosis  Pre-op Diagnosis     * Unilateral primary osteoarthritis, right hip [M16.11] Post-op Diagnosis     * Unilateral primary osteoarthritis, right hip [M16.11]     Procedures  RIGHT TOTAL HIP REPLACEMENT  81727 - [place] ARTHRP ACETBLR/PROX FEM PROSTC AGRFT/ALGRFT      Surgeons      * Gaetano Hoffman - Primary    Resident/Fellow/Other Assistant:  Surgeons and Role:     * Jefferson Stephenson MD - Assisting    Procedure Summary  Anesthesia: General  ASA: III  Anesthesia Staff: Anesthesiologist: Skyler Gibbons MD  C-AA: TALHA Orozco  Estimated Blood Loss: 100 mL  Intra-op Medications:   Administrations occurring from 0735 to 0905 on 24:   Medication Name Total Dose   ceFAZolin in dextrose (iso-os) (Ancef) IVPB 2 g 2 g              Anesthesia Record               Intraprocedure I/O Totals          Intake    LR bolus 1000.00 mL    Phenylephrine Drip 0.00 mL    The total shown is the total volume documented since Anesthesia Start was filed.    ceFAZolin in dextrose (iso-os) (Ancef) IVPB 2 g 100.00 mL    Total Intake 1100 mL          Specimen: No specimens collected     Staff:   Circulator:   Scrub Person: Abigail  Scrub Person: Austin         Drains and/or Catheters: * None in log *    Tourniquet Times:         Implants:  Implants       Type Name Action Serial No.      Joint SHELL, TRIDENT II, CLUSTERHOLE, 52E - [id number] Implanted      Screw SCREW, LOW PROFILE HEX, 6.5 X 30 MM - [id number] Implanted      Joint INSERT, TRIDENT X3 POLYETHYLENE, 0 DEG, 36MM E - [id number] Implanted      Joint STEM, ACCOLADE II, 127D SZ 7 - [id number] Implanted      Joint HEAD, FEMUR V40 36MM -5MM BIOLOX DELTA - [id number] Implanted               Findings: Severe arthritis in the right hip with subchondral cysts present-acetabulum    Indications:  Vanna Bhakta is an 69 y.o. female who is having surgery for M16.11.  Her right hip arthritis  Patient had undergone a left total hip replacement last year, and done very well    The patient was seen in the preoperative area. The risks, benefits, complications, treatment options, non-operative alternatives, expected recovery and outcomes were discussed with the patient. The possibilities of reaction to medication, pulmonary aspiration, injury to surrounding structures, bleeding, recurrent infection, the need for additional procedures, failure to diagnose a condition, and creating a complication requiring transfusion or operation were discussed with the patient. The patient concurred with the proposed plan, giving informed consent.  The site of surgery was properly noted/marked if necessary per policy. The patient has been actively warmed in preoperative area. Preoperative antibiotics have been ordered and given within 1 hours of incision. Venous thrombosis prophylaxis have been ordered including bilateral sequential compression devices    Procedure Details:   INDICATIONS FOR SURGERY:    The patient is a 69-year-old female who presented with increasing pain and discomfort in her right hip.    The patient noticed pain and discomfort in the  hip, and x-rays revealed moderate to severe degenerative changes in the right hip.  Patient had undergone a left total hip arthroplasty last year and done very well.  The risks and benefits of a total hip replacement have been discussed in detail.    The risks of anesthesia; risk of injury to the vessel, nerve, ligament, fractures; postoperative complications such as hematoma formation, infection, DVT, PE, continued pain in the hip, restricted range of motion, limb length discrepancy, dislocation, prolonged period of rehab and therapy, need for further surgical intervention, osteolysis, loosening, failure of the implant have been discussed. Wound healing problems and  Complications from her medical condition were also discussed.  Need for blood transfusion was discussed.  After careful consideration of all the options, he agrees to have surgical procedure done.     OPERATIVE NOTE:    The patient was brought to the operating room and spinal anesthetic was induced by the Anesthetic services.  The patient was positioned laterally.  All bony prominences were well padded.  The right lower extremity was prepped and draped in usual sterile fashion.  The patient had 2 g of Ancef given prior to the start of the procedure. This will be continued for 24 hours.  DVT prophylaxis will begin postoperatively in the form of aspirin.  Early mobilization will be encouraged.  SCD devices will be applied.  The patient had 2 g of TXA given prior to the start of the procedure.  The skin and subcutaneous tissue had been marked.  A time-out was called.  An I[place]an drape was used.  The skin was cut in a posterolateral fashion.  The patient has significant subcutaneous fat present around the greater trochanteric area.  This was carefully deepened down and the greater trochanter was identified.  The fascia was identified and cut along the direction of the incision.  The self-retaining retractors were placed.  The surgical site was repeatedly irrigated with dilute Betadine solution.  The sciatic nerve was palpated, kept well out of the way throughout the procedure.  The short external rotators were carefully cut.  Short external rotators were cut and carefully retracted.  The capsule was also similarly cut and retracted.  The head of the femur was visualized.  The head was dislocated and brought into view.  The offset and the neck length were measured and these were used to check at the end of the procedure.  The neck was cut retaining about 15 mm of neck.  The head was removed.   Severe degenerative changes were noticed in the femoral head with loss of cartilage.  Subchondral cysts were also present in the  acetabulum    Similar changes were noticed in the acetabulum.  The remnants of the labrum were carefully excised anteriorly and posteriorly and the retractors were placed carefully over the anterior and posterior aspect to retract all the soft tissues.  The hip joint was irrigated out once again.  Attention was first turned towards the acetabulum. Reaming of the acetabulum was then commenced starting with a size 48 up to a size 51.  The 52 trial component was placed and excellent fit was obtained.  Reaming was done in about 45 degrees of closure and 15 degrees of anteversion.  The trial component gave an excellent fit. The trial component was removed.  The true component was taken and an excellent fit was obtained.  This was a cluster hole cup.  Single screw was used to augment the fixation.    The patient had a 0-degree lip liner was trialed and therefore a 0-degree lip liner was used.      Attention was turned towards the femur.  This was fully internally rotated and brought into view.  A box osteotome was used to make an opening in the piriformis fossa.  T-handle reamer was used to go down the shaft.  Progressive broaching was then commenced starting with a size 0 up to a size 7.  The size 7 broach gave an excellent fit.  A trial reduction was done with a -5 mm neck with 32 head.  The hip was put through a full range of motion.  A combined anteversion of 30 degrees was noted.  The hip joint was irrigated out completely.  The trial liner with a 0-degree lip was locked into the acetabulum.  There was no instability.  The limb length had been reproduced.  The offset and the neck length had been checked and were reproduced.  There was no impingement  noted posteriorly.  Osteophytes were removed anteriorly.  The 0-degree lip liner was locked into place, followed by placing the size 16 femoral  component in valgus position and 10 degrees of anteversion.  The 32  ceramic head, -5, was placed and carefully reduced,  checked for stability, put through its full range of motion, noted to be entirely satisfactory.    The hip joint was once again irrigated out.  The short external rotators and capsule were repaired back to the insertion by means of drill holes. The fascia flaquita was closed with a combination of Ethibond and 0 Vicryl.  Subcutaneous layers were closed with 2-0 Vicryl.  Deeper fat sutures were also placed skin was closed with stratafix. Meplex dressing wasa applied. The patient made a satisfactory recovery.  She will mobilize full weight bearing on his hip.    The role of the assistants involved was in prepping of the right lower extremity and positioning the patient.  During the surgical procedure retraction of the soft tissues were was done by then.  Manipulation of the hip was required during placement of the implants.  They were also involved in closure of the tendinous structures subcutaneous layers as well as the skin.  Application of the dressings as well as moving the patient from the operating table to the regular bed.   No qualified Resident was available to assist in this case.     Complications:  None; patient tolerated the procedure well.    Disposition: PACU - hemodynamically stable.  Condition: stable         Additional Details:     Attending Attestation: I was present and scrubbed for the key portions of the procedure.    Gaetano Hoffman  Phone Number: [phone]

## 2024-05-22 NOTE — DISCHARGE INSTR - ACTIVITY
*Call Dr. Hoffman for any problems and/or concerns.  *Maintain hip precautions  *Weight bearing as tolerated with a wheeled walker  *Remove surgical dressing post-operative day #7 and replace with a new dressing.  *You may shower, do not soak or scrub incision; pat dry  *Apply ice to hip as needed to minimize swelling, on 20 minutes, off 20 minutes  *Continue the coughing and deep breathing exercises that you learned in the hospital  *Move around as much as you can tolerate because movement can help you heal and helps prevent stiffness, skin sores, and blood clots    Call Doctor right away for:  *Increased hip pain  *Pain or swelling in your calf of leg  *Fever above 100.4/shaking chills  *Excessive swelling, increased redness or drainage around the incision  *Your incision breaks open  *Chest pain/shortness of breath, call 911    After the procedure it is common to have pain and swelling.      -To help prevent hip dislocation, follow instructions about movement restrictions as told by your physician:  *Do not cross your legs at the knees. To remind yourself about this, you may keep a pillow between your legs while lying in bed  *Do not bend at the hip and waist or bend farther than 90 degrees of flexion.   To avoid bending this far: do not bring your knees higher than your hips, do not  something from the floor while sitting in a chair, avoid sitting in low chairs, used raised toilet seat (if needed), when standing up from a seated position- keep injured leg out in front of you  *Avoid twisting at your waist and reaching across your body to the side of the affected leg  *Avoid rotating your toes inward on the affected leg    *When getting into a car:  1. Raise the seat as high as possible, move the seat as far back as it will go, and recline the upper part of the seat slightly  2. Sit down on the seat with your injured leg extended out of the car  3. Scoot back in the seat as you move the lower half of your  body into the car. Try to avoid bumping your foot or leg as you bring it into the car. To make this motion smooth and easy on a cloth seat, try placing a plastic bag on the seat    *If your physician has prescribed compression stockings please use as directed. These stockings help to prevent blood clots and reduce swelling in your legs  *Continue to do the exercises you learned in the hospital: i.e. ankle pumps  *If you were prescribed a blood thinner (anticoagulant), take it as prescribed    *Do not use any products that contain nicotine or tobacco, such as cigarettes and e-cigarettes. These can delay bone healing. If you need help quitting, ask your healthcare provider    If you are taking prescription pain medication, take actions to prevent or treat constipation.  -drink enough fluids to keep your urine pale yellow  -eat foods that are high in fiber, such as fresh fruits and vegetables, whole grains, and beans  -limit food that are high in fat and processed sugars, such as fried or sweet foods  -take an over the counter or prescribed medicine for constipation

## 2024-05-22 NOTE — CARE PLAN
The patient's goals for the shift include      The clinical goals for the shift include pain managable level      Problem: Pain  Goal: Takes deep breaths with improved pain control throughout the shift  Outcome: Progressing  Goal: Turns in bed with improved pain control throughout the shift  Outcome: Progressing  Goal: Walks with improved pain control throughout the shift  Outcome: Progressing  Goal: Performs ADL's with improved pain control throughout shift  Outcome: Progressing  Goal: Participates in PT with improved pain control throughout the shift  Outcome: Progressing  Goal: Free from opioid side effects throughout the shift  Outcome: Progressing  Goal: Free from acute confusion related to pain meds throughout the shift  Outcome: Progressing     Problem: Pain - Adult  Goal: Verbalizes/displays adequate comfort level or baseline comfort level  Outcome: Progressing     Problem: Safety - Adult  Goal: Free from fall injury  Outcome: Progressing     Problem: Discharge Planning  Goal: Discharge to home or other facility with appropriate resources  Outcome: Progressing     Problem: Chronic Conditions and Co-morbidities  Goal: Patient's chronic conditions and co-morbidity symptoms are monitored and maintained or improved  Outcome: Progressing     Problem: Fall/Injury  Goal: Not fall by end of shift  Outcome: Progressing  Goal: Be free from injury by end of the shift  Outcome: Progressing  Goal: Verbalize understanding of personal risk factors for fall in the hospital  Outcome: Progressing  Goal: Verbalize understanding of risk factor reduction measures to prevent injury from fall in the home  Outcome: Progressing  Goal: Use assistive devices by end of the shift  Outcome: Progressing  Goal: Pace activities to prevent fatigue by end of the shift  Outcome: Progressing     Problem: Meds/Post-op Pain  Goal: Pain controlled to tolerate pain level  Outcome: Progressing  Goal: Tolerates prescribed medication  Outcome:  Progressing     Problem: DVT/VTE Prevention/Activity  Goal: No decrease in circulation/sensation  Outcome: Progressing  Goal: Prevent skin breakdown  Outcome: Progressing  Goal: Return to preop oxygenation status  Outcome: Progressing  Goal: Tolerates optimal activity  Outcome: Progressing  Goal: Increase self care and/or family involvement in 24 hrs.  Outcome: Progressing     Problem: Wound care/infection prevention  Goal: No signs of infection in 24 hrs.  Outcome: Progressing  Goal: No unexpected bleeding from incision this shift  Outcome: Progressing     Problem: Diet/fluid balance  Goal: Adequate urinary output  Outcome: Progressing  Goal: Free from nausea/vomiting  Outcome: Progressing  Goal: Return in bowel function  Outcome: Progressing  Goal: Tolerates prescribed diet  Outcome: Progressing     Problem: Other goals  Goal: No change in neurological status  Outcome: Progressing  Goal: Stabilize vital signs (return to 10% of baseline)  Outcome: Progressing     Problem: Hip Replacement Intial Post Op  Goal: Activity/Mobility Safety  Outcome: Progressing  Goal: Treatment  Outcome: Progressing     Problem: Hip Replacement Post Op Day 1  Goal: Activity/Mobility Safety  Outcome: Progressing  Goal: Treatment  Outcome: Progressing   Pt remained safe since arrival to floor, resting quietly at this time.

## 2024-05-22 NOTE — PROGRESS NOTES
Occupational Therapy  Evaluation    Patient Name: Vanna Bhakta  MRN: [id number]  Today's Date: 5/22/2024  Time Calculation  Start Time: 1437  Stop Time: 1505  Time Calculation (min): 28 min    Assessment  IP OT Assessment  OT Assessment: Patient demonstrates decreased independence with self care, decreased independence with functional transfers, decreased balance, and decreased endurance.  Patient will benefit from skilled OT to address deficits.  Anticipate safe discharge to prior level of living with low intensity therapy.  Prognosis: Good  Barriers to Discharge: None  Evaluation/Treatment Tolerance: Patient tolerated treatment well  Medical Staff Made Aware: Yes  End of Session Communication: Bedside nurse  End of Session Patient Position: Up in chair, Alarm on    Plan:  Treatment Interventions: ADL retraining, Functional transfer training, Endurance training, Patient/family training  OT Frequency: Daily  OT Discharge Recommendations: Low intensity level of continued care  Equipment Recommended upon Discharge: Wheeled walker  OT Recommended Transfer Status: Minimal assist  OT - OK to Discharge: Yes (to next level of care when medically cleared by physician)    Subjective     Current Problem:  No diagnosis found.    General:  General  Reason for Referral: s/p POD 0 right JOVANA  Referred By: Dr. Hoffman  Past Medical History Relevant to Rehab: presents for elective right JOVANA  Co-Treatment: PT  Co-Treatment Reason: for safety  Prior to Session Communication: Bedside nurse  Patient Position Received: Bed, 2 rail up, Alarm on  General Comment: Patient agreeable to therapy.    Precautions:  LE Weight Bearing Status: Weight Bearing as Tolerated  Post-Surgical Precautions: Left hip precautions (posterior)      Pain:  Pain Assessment  Pain Assessment: 0-10  Pain Score: 4  Pain Type: Surgical pain  Pain Location: Hip  Pain Orientation: Right  Pain Interventions: Medication (See MAR), Repositioned, Cold  applied    Objective     Cognition:  Overall Cognitive Status: Within Functional Limits        Home Living:  Type of Home: [place]  Lives With: Alone (niece will be staying with patient)  Home Adaptive Equipment: Walker rolling or standard, Cane, Reacher, Sock aid  Home Layout: One level  Home Access: Level entry  Bathroom Shower/Tub: Walk-in shower     Prior Function:  Level of [place]: Independent with ADLs and functional transfers  Prior Function Comments: Started using a walker the last few days prior to surgery      ADL:  Grooming Assistance: Stand by (standing at sink to wash hands)  Toileting Assistance with Device: Minimal    Activity Tolerance:  Endurance: Tolerates 10 - 20 min exercise with multiple rests    Bed Mobility/Transfers:   Bed Mobility  Bed Mobility: Yes (supine to sit with min A)  Transfers  Transfer: Yes (sit to stand with min A with FWW, transfer on and off toilet with BSC over toilet with CGA with FWW, transfer to chair with SBA with FWW-cues for technique to maintain hip precautions.)    Ambulation/Gait Training:  Functional Mobility  Functional Mobility Performed: Yes (Ambulated to and from bathroom with FWW with CGA)      Extremities: RUE   RUE : Within Functional Limits and LUE   LUE: Within Functional Limits    Outcome Measures: [facility] Daily Activity  Putting on and taking off regular lower body clothing: A lot  Bathing (including washing, rinsing, drying): A lot  Putting on and taking off regular upper body clothing: None  Toileting, which includes using toilet, bedpan or urinal: A little  Taking care of personal grooming such as brushing teeth: None  Eating Meals: None  Daily Activity - Total Score: 19           EDUCATION:  Education  Individual(s) Educated: Patient  Education Provided: Fall precautons, Risk and benefits of OT discussed with patient or other  Plan of Care Discussed and Agreed Upon: yes  Patient Response to Education: Patient/Caregiver Verbalized Understanding of  Information    Goals:   Encounter Problems       Encounter Problems (Active)       OT Goals       Patient will complete functional transfers with mod I. (Progressing)       Start:  05/22/24    Expected End:  06/05/24            Patient will complete LE dressing with mod I following hip precautions. (Progressing)       Start:  05/22/24    Expected End:  06/05/24            Patient will complete toileting with mod I. (Progressing)       Start:  05/22/24    Expected End:  06/05/24

## 2024-05-22 NOTE — PROGRESS NOTES
Physical Therapy    Physical Therapy Evaluation and Treatment    Patient Name: Vanna Bhakta  MRN: [id number]  Today's Date: 5/22/2024   Time Calculation  Start Time: 1438  Stop Time: 1505  Time Calculation (min): 27 min    Assessment/Plan   PT Assessment  PT Assessment Results: Decreased strength, Decreased range of motion, Decreased endurance, Impaired balance, Decreased mobility, Pain, Orthopedic restrictions  Rehab Prognosis: Good  Evaluation/Treatment Tolerance: Patient tolerated treatment well  Medical Staff Made Aware: Yes  End of Session Communication: Bedside nurse  Assessment Comment: Pt presents today below baseline level of function and requires continued PT during hospital stay. Pt requires PT at a low intensity level at discharge to maximize functional mobility and safety.    End of Session Patient Position: Up in chair, Alarm on  IP OR SWING BED PT PLAN  Inpatient or Swing Bed: Inpatient  PT Plan  Treatment/Interventions: Bed mobility, Transfer training, Gait training, Balance training, Neuromuscular re-education, Strengthening, Endurance training, Range of motion, Therapeutic exercise, Therapeutic activity, Home exercise program, Stair training  PT Plan: Skilled PT  PT Frequency: BID  PT Discharge Recommendations: Low intensity level of continued care  Equipment Recommended upon Discharge: Wheeled walker  PT Recommended Transfer Status: Assist x1  PT - OK to Discharge: Yes (To next level of care when cleared by medical team   )    Subjective         General Visit Information:  General  Reason for Referral: JOVANA  Referred By: Gaetano Hoffman MD  Past Medical History Relevant to Rehab: pt admitted 5/22/24 following completion of right posterior JOVANA.  Family/Caregiver Present: No  Co-Treatment: OT  Co-Treatment Reason: to maximize pt safety and functional mobility  Prior to Session Communication: Bedside nurse  Patient Position Received: Bed, 3 rail up, Alarm on  Preferred Learning Style:  verbal  General Comment: Pt agreeable to PT, nursing cleared for treatment.    Home Living:  Home Living  Type of Home: [place]  Lives With: Alone  Home Adaptive Equipment: Walker rolling or standard, Cane, Reacher, Sock aid  Home Layout: One level  Home Access: Stairs to enter without rails  Entrance Stairs-Number of Steps: one curb style CHERELLE  Bathroom Shower/Tub: Walk-in shower  Home Living Comments: niece will stay with pt at d/c    Prior Level of Function:  Prior Function Per Pt/Caregiver Report  ADL Assistance: Independent  Ambulatory Assistance:  (mod I with use of cane or FWW for the last few days leading up to surgery)    Precautions:  Precautions  LE Weight Bearing Status: Weight Bearing as Tolerated  Medical Precautions: Fall precautions  Post-Surgical Precautions:  (right posterior hip precautions)    Vital Signs:     Objective     Pain:  Pain Assessment  Pain Assessment: 0-10  Pain Score: 5 - Moderate pain  Pain Type: Surgical pain  Pain Location: Hip  Pain Orientation: Right  Pain Interventions: Repositioned, Cold applied, Ambulation/increased activity    Cognition:  Cognition  Overall Cognitive Status: Within Functional Limits    General Assessments:      Activity Tolerance  Endurance: Tolerates 10 - 20 min exercise with multiple rests                 Static Sitting Balance  Static Sitting-Comment/Number of Minutes: good  Dynamic Sitting Balance  Dynamic Sitting-Comments: good  Static Standing Balance  Static Standing-Comment/Number of Minutes: fair  Dynamic Standing Balance  Dynamic Standing-Comments: fair    Functional Assessments:     Bed Mobility  Bed Mobility: Yes  Bed Mobility 1  Bed Mobility 1: Supine to sitting  Level of Assistance 1: Minimum assistance  Transfers  Transfer: Yes  Transfer 1  Transfer From 1: Sit to  Transfer to 1: Stand  Transfer Device 1: Walker  Transfer Level of Assistance 1: Minimum assistance  Transfers 2  Transfer From 2: Stand to  Transfer to 2: Sit  Transfer Device 2:  Walker  Transfer Level of Assistance 2: Minimum assistance  Ambulation/Gait Training  Ambulation/Gait Training Performed: Yes  Ambulation/Gait Training 1  Device 1: Rolling walker  Gait Support Devices: Gait belt  Assistance 1: Contact guard  Quality of Gait 1: Decreased step length, Inconsistent stride length, Antalgic (slight FF posture, step to gait. slow pace)  Comments/Distance (ft) 1: 20 feet x 2        Treatment    Education provided on posterior hip precautions. Cues for safe hand placement with use of FWW for sit<>stand. Instruction provided in step to gait pattern with cues given for sequencing with FWW. Cues given for turning strategy to avoid pivoting on right LE.     Instruction provided in ankle pumps, quad sets, glute sets in order to maximize strength and circulation. Cues given for proper technique and parameters.     Extremity/Trunk Assessments:        RLE   RLE : Exceptions to WFL  AROM RLE (degrees)  RLE AROM Comment: WFL except hip 50-75% of range  Strength RLE  RLE Overall Strength: Greater than or equal to 3/5 as evidenced by functional mobility  LLE   LLE : Within Functional Limits    Outcome Measures:  [facility] Basic Mobility  Turning from your back to your side while in a flat bed without using bedrails: A little  Moving from lying on your back to sitting on the side of a flat bed without using bedrails: A little  Moving to and from bed to chair (including a wheelchair): A little  Standing up from a chair using your arms (e.g. wheelchair or bedside chair): A little  To walk in hospital room: A little  Climbing 3-5 steps with railing: A little  Basic Mobility - Total Score: 18                            Goals:  Encounter Problems       Encounter Problems (Active)       PT Problem       Pt will perform bed mobility with mod I.        Start:  05/22/24    Expected End:  06/05/24            Pt will complete sit <> stand and bed <> chair transfers with mod I.        Start:  05/22/24    Expected End:   06/05/24            Pt will ambulate 300 feet mod I using FWW with no significant gait deviations.         Start:  05/22/24    Expected End:  06/05/24            Pt will verbalize and demonstrate understanding of JOVANA supine/seated exercises.        Start:  05/22/24    Expected End:  06/05/24            Pt will ascend/descend at least one stair in order to navigate home environment.         Start:  05/22/24    Expected End:  06/05/24                       Education Documentation  Precautions, taught by Paige Wray PT at 5/22/2024  4:48 PM.  Learner: Patient  Readiness: Acceptance  Method: Explanation  Response: Verbalizes Understanding    Body Mechanics, taught by Paige Wray PT at 5/22/2024  4:48 PM.  Learner: Patient  Readiness: Acceptance  Method: Explanation  Response: Verbalizes Understanding    Home Exercise Program, taught by Paige Wray PT at 5/22/2024  4:48 PM.  Learner: Patient  Readiness: Acceptance  Method: Explanation  Response: Verbalizes Understanding    Mobility Training, taught by Paige Wray PT at 5/22/2024  4:48 PM.  Learner: Patient  Readiness: Acceptance  Method: Explanation  Response: Verbalizes Understanding    Education Comments  No comments found.

## 2024-05-22 NOTE — ANESTHESIA PREPROCEDURE EVALUATION
Patient: Vanna Bhakta    Procedure Information       Date/Time: 05/22/24 0735    Procedure: RIGHT TOTAL HIP REPLACEMENT (Right: Hip)    Location: [facility] OR 09 / [facility] OR    Surgeons: Gaetano Hoffman MD            Relevant Problems   No relevant active problems       Clinical information reviewed:   Tobacco  Allergies  Meds  Problems  Med Hx  Surg Hx   Fam Hx  Soc   Hx        NPO Detail:  NPO/Void Status  Date of Last Liquid: 05/21/24  Time of Last Liquid: 2030  Date of Last Solid: 05/21/24  Time of Last Solid: 2030         Physical Exam    Airway  Mallampati: II  TM distance: >3 FB  Neck ROM: full     Cardiovascular - normal exam  Rhythm: regular  Rate: normal     Dental - normal exam     Pulmonary - normal exam  Breath sounds clear to auscultation     Abdominal   (+) obese  Abdomen: soft  Bowel sounds: normal           Anesthesia Plan    History of general anesthesia?: yes  History of complications of general anesthesia?: no    ASA 3     general     The patient is not a current smoker.  Patient was previously instructed to abstain from smoking on day of procedure.  Patient did not smoke on day of procedure.  Education provided regarding risk of obstructive sleep apnea.  intravenous induction   Postoperative administration of opioids is intended.  Anesthetic plan and risks discussed with patient.  Use of blood products discussed with patient who consented to blood products.    Plan discussed with CAA.

## 2024-05-22 NOTE — NURSING NOTE
Pt arrived to floor at this time via bed, orientated to room and surroundings.  Call light within reach.

## 2024-05-22 NOTE — ANESTHESIA PROCEDURE NOTES
Spinal Block    Patient location during procedure: OR  Start time: 5/22/2024 7:49 AM  End time: 5/22/2024 7:55 AM  Reason for block: primary anesthetic  Staffing  Performed: TALHA   Authorized by: Skyler Gibbons MD    Performed by: TALHA Orozco    Preanesthetic Checklist  Completed: patient identified, IV checked, risks and benefits discussed, surgical consent, monitors and equipment checked, pre-op evaluation, timeout performed and sterile techniques followed  Block Timeout  RN/Licensed healthcare professional reads aloud to the Anesthesia provider and entire team: Patient identity, procedure with side and site, patient position, and as applicable the availability of implants/special equipment/special requirements.  Patient on coagulant treatment: no  Timeout performed at: 5/22/2024 7:48 AM  Spinal Block  Patient position: sitting  Prep: ChloraPrep  Sterility prep: cap, drape, gloves, hand hygiene and mask  Sedation level: general anesthesia  Patient monitoring: blood pressure, continuous pulse oximetry, EKG, ETCO2 and heart rate  Approach: midline  Vertebral space: L3-4  Injection technique: single-shot  Needle  Needle type: Quincke   Needle gauge: 25 G  Needle length: 3.5 in  Free flowing CSF: yes    Assessment  Sensory level: T4 bilateral  Block outcome: patient comfortable  Procedure assessment: patient sedated but conversant throughout procedure and patient tolerated procedure well with no immediate complications

## 2024-05-22 NOTE — ANESTHESIA POSTPROCEDURE EVALUATION
Patient: Vanna Bhakta    Procedure Summary       Date: 05/22/24 Room / Location: [facility] OR 09 / [facility] OR    Anesthesia Start: 0748 Anesthesia Stop: 1019    Procedure: RIGHT TOTAL HIP REPLACEMENT (Right: Hip) Diagnosis:       Unilateral primary osteoarthritis, right hip      (M16.11)    Surgeons: Gaetano Hoffman MD Responsible Provider: Skyler Gibbons MD    Anesthesia Type: general ASA Status: 3            Anesthesia Type: general    Vitals Value Taken Time   /65 05/22/24 1030   Temp 36 °C (96.8 °F) 05/22/24 1016   Pulse 59 05/22/24 1033   Resp 22 05/22/24 1033   SpO2 100 % 05/22/24 1033   Vitals shown include unfiled device data.    Anesthesia Post Evaluation    Patient location during evaluation: PACU  Patient participation: complete - patient participated  Level of consciousness: sleepy but conscious, responsive to verbal stimuli, responsive to light touch, responsive to physical stimuli and sedated  Pain score: 0  Pain management: adequate  Airway patency: patent  Two or more strategies used to mitigate risk of obstructive sleep apnea  Cardiovascular status: acceptable, hemodynamically stable and stable  Respiratory status: acceptable, unassisted, spontaneous ventilation and nonlabored ventilation  Hydration status: balanced  Postoperative Nausea and Vomiting: none        There were no known notable events for this encounter.

## 2024-05-23 VITALS
WEIGHT: 190.04 LBS | HEART RATE: 93 BPM | HEIGHT: 68 IN | TEMPERATURE: 97.2 F | DIASTOLIC BLOOD PRESSURE: 62 MMHG | SYSTOLIC BLOOD PRESSURE: 100 MMHG | RESPIRATION RATE: 16 BRPM | BODY MASS INDEX: 28.8 KG/M2 | OXYGEN SATURATION: 97 %

## 2024-05-23 LAB
ANION GAP SERPL CALC-SCNC: 10 MMOL/L (ref 10–20)
BUN SERPL-MCNC: 8 MG/DL (ref 6–23)
CALCIUM SERPL-MCNC: 9.1 MG/DL (ref 8.6–10.3)
CHLORIDE SERPL-SCNC: 103 MMOL/L (ref 98–107)
CO2 SERPL-SCNC: 25 MMOL/L (ref 21–32)
CREAT SERPL-MCNC: 0.66 MG/DL (ref 0.5–1.05)
EGFRCR SERPLBLD CKD-EPI 2021: >90 ML/MIN/1.73M*2
ERYTHROCYTE [DISTWIDTH] IN BLOOD BY AUTOMATED COUNT: 13.8 % (ref 11.5–14.5)
GLUCOSE SERPL-MCNC: 122 MG/DL (ref 74–99)
HCT VFR BLD AUTO: 35.1 % (ref 36–46)
HGB BLD-MCNC: 11.3 G/DL (ref 12–16)
MCH RBC QN AUTO: 29.8 PG (ref 26–34)
MCHC RBC AUTO-ENTMCNC: 32.2 G/DL (ref 32–36)
MCV RBC AUTO: 93 FL (ref 80–100)
NRBC BLD-RTO: 0 /100 WBCS (ref 0–0)
PLATELET # BLD AUTO: 196 X10*3/UL (ref 150–450)
POTASSIUM SERPL-SCNC: 3.8 MMOL/L (ref 3.5–5.3)
RBC # BLD AUTO: 3.79 X10*6/UL (ref 4–5.2)
SODIUM SERPL-SCNC: 134 MMOL/L (ref 136–145)
WBC # BLD AUTO: 11.8 X10*3/UL (ref 4.4–11.3)

## 2024-05-23 PROCEDURE — 2500000001 HC RX 250 WO HCPCS SELF ADMINISTERED DRUGS (ALT 637 FOR MEDICARE OP): Performed by: PHYSICIAN ASSISTANT

## 2024-05-23 PROCEDURE — 97535 SELF CARE MNGMENT TRAINING: CPT | Mod: GO,CO

## 2024-05-23 PROCEDURE — 36415 COLL VENOUS BLD VENIPUNCTURE: CPT | Performed by: PHYSICIAN ASSISTANT

## 2024-05-23 PROCEDURE — 2500000004 HC RX 250 GENERAL PHARMACY W/ HCPCS (ALT 636 FOR OP/ED): Performed by: PHYSICIAN ASSISTANT

## 2024-05-23 PROCEDURE — 2500000001 HC RX 250 WO HCPCS SELF ADMINISTERED DRUGS (ALT 637 FOR MEDICARE OP): Performed by: ORTHOPAEDIC SURGERY

## 2024-05-23 PROCEDURE — 2500000004 HC RX 250 GENERAL PHARMACY W/ HCPCS (ALT 636 FOR OP/ED): Performed by: ORTHOPAEDIC SURGERY

## 2024-05-23 PROCEDURE — 80048 BASIC METABOLIC PNL TOTAL CA: CPT | Performed by: PHYSICIAN ASSISTANT

## 2024-05-23 PROCEDURE — 97110 THERAPEUTIC EXERCISES: CPT | Mod: GP,CQ

## 2024-05-23 PROCEDURE — 97116 GAIT TRAINING THERAPY: CPT | Mod: GP,CQ

## 2024-05-23 PROCEDURE — 85027 COMPLETE CBC AUTOMATED: CPT | Performed by: ORTHOPAEDIC SURGERY

## 2024-05-23 PROCEDURE — 7100000011 HC EXTENDED STAY RECOVERY HOURLY - NURSING UNIT

## 2024-05-23 RX ORDER — MELOXICAM 15 MG/1
15 TABLET ORAL DAILY
Qty: 15 TABLET | Refills: 0 | Status: SHIPPED | OUTPATIENT
Start: 2024-05-23 | End: 2024-06-07

## 2024-05-23 RX ORDER — KETOROLAC TROMETHAMINE 15 MG/ML
15 INJECTION, SOLUTION INTRAMUSCULAR; INTRAVENOUS EVERY 6 HOURS
Status: DISCONTINUED | OUTPATIENT
Start: 2024-05-23 | End: 2024-05-23 | Stop reason: HOSPADM

## 2024-05-23 RX ORDER — ASPIRIN 81 MG/1
81 TABLET ORAL 2 TIMES DAILY
Qty: 60 TABLET | Refills: 0 | Status: SHIPPED | OUTPATIENT
Start: 2024-05-23 | End: 2024-06-22

## 2024-05-23 RX ORDER — HYDROCODONE BITARTRATE AND ACETAMINOPHEN 5; 325 MG/1; MG/1
1 TABLET ORAL EVERY 6 HOURS PRN
Qty: 28 TABLET | Refills: 0 | Status: SHIPPED | OUTPATIENT
Start: 2024-05-23 | End: 2024-05-30

## 2024-05-23 RX ORDER — HYDROCODONE BITARTRATE AND ACETAMINOPHEN 5; 325 MG/1; MG/1
1 TABLET ORAL EVERY 4 HOURS PRN
Status: DISCONTINUED | OUTPATIENT
Start: 2024-05-23 | End: 2024-05-23 | Stop reason: HOSPADM

## 2024-05-23 RX ORDER — ACETAMINOPHEN 325 MG/1
650 TABLET ORAL EVERY 8 HOURS
Status: DISCONTINUED | OUTPATIENT
Start: 2024-05-23 | End: 2024-05-23 | Stop reason: HOSPADM

## 2024-05-23 RX ORDER — HYDROCODONE BITARTRATE AND ACETAMINOPHEN 10; 325 MG/1; MG/1
1 TABLET ORAL EVERY 4 HOURS PRN
Status: DISCONTINUED | OUTPATIENT
Start: 2024-05-23 | End: 2024-05-23 | Stop reason: HOSPADM

## 2024-05-23 RX ADMIN — CEFAZOLIN SODIUM 2 G: 2 INJECTION, SOLUTION INTRAVENOUS at 00:34

## 2024-05-23 RX ADMIN — Medication 1 TABLET: at 09:05

## 2024-05-23 RX ADMIN — OXYCODONE HYDROCHLORIDE 10 MG: 10 TABLET ORAL at 04:43

## 2024-05-23 RX ADMIN — KETOROLAC TROMETHAMINE 15 MG: 15 INJECTION, SOLUTION INTRAMUSCULAR; INTRAVENOUS at 12:43

## 2024-05-23 RX ADMIN — ASPIRIN 81 MG: 81 TABLET, COATED ORAL at 09:05

## 2024-05-23 RX ADMIN — FERROUS SULFATE TAB 325 MG (65 MG ELEMENTAL FE) 1 TABLET: 325 (65 FE) TAB at 09:05

## 2024-05-23 RX ADMIN — OXYCODONE HYDROCHLORIDE 10 MG: 10 TABLET ORAL at 09:05

## 2024-05-23 RX ADMIN — OXYCODONE HYDROCHLORIDE 10 MG: 10 TABLET ORAL at 00:34

## 2024-05-23 RX ADMIN — POLYETHYLENE GLYCOL 3350 17 G: 17 POWDER, FOR SOLUTION ORAL at 09:05

## 2024-05-23 RX ADMIN — ACETAMINOPHEN 650 MG: 325 TABLET ORAL at 12:43

## 2024-05-23 RX ADMIN — CYCLOBENZAPRINE HYDROCHLORIDE 10 MG: 10 TABLET, FILM COATED ORAL at 04:43

## 2024-05-23 RX ADMIN — HYDROCODONE BITARTRATE AND ACETAMINOPHEN 1 TABLET: 5; 325 TABLET ORAL at 14:54

## 2024-05-23 RX ADMIN — MORPHINE SULFATE 2 MG: 2 INJECTION, SOLUTION INTRAMUSCULAR; INTRAVENOUS at 03:36

## 2024-05-23 ASSESSMENT — PAIN DESCRIPTION - ORIENTATION
ORIENTATION: RIGHT

## 2024-05-23 ASSESSMENT — COGNITIVE AND FUNCTIONAL STATUS - GENERAL
TURNING FROM BACK TO SIDE WHILE IN FLAT BAD: A LITTLE
DRESSING REGULAR UPPER BODY CLOTHING: A LITTLE
MOVING FROM LYING ON BACK TO SITTING ON SIDE OF FLAT BED WITH BEDRAILS: A LITTLE
DRESSING REGULAR LOWER BODY CLOTHING: A LITTLE
TOILETING: A LITTLE
MOVING TO AND FROM BED TO CHAIR: A LITTLE
HELP NEEDED FOR BATHING: A LITTLE
MOBILITY SCORE: 17
WALKING IN HOSPITAL ROOM: A LITTLE
DAILY ACTIVITIY SCORE: 20
CLIMB 3 TO 5 STEPS WITH RAILING: A LOT
STANDING UP FROM CHAIR USING ARMS: A LITTLE

## 2024-05-23 ASSESSMENT — PAIN SCALES - GENERAL
PAINLEVEL_OUTOF10: 5 - MODERATE PAIN
PAINLEVEL_OUTOF10: 4
PAINLEVEL_OUTOF10: 9
PAINLEVEL_OUTOF10: 9
PAINLEVEL_OUTOF10: 8
PAINLEVEL_OUTOF10: 6
PAINLEVEL_OUTOF10: 9
PAINLEVEL_OUTOF10: 8
PAINLEVEL_OUTOF10: 3
PAINLEVEL_OUTOF10: 6
PAINLEVEL_OUTOF10: 8
PAINLEVEL_OUTOF10: 5 - MODERATE PAIN
PAINLEVEL_OUTOF10: 6

## 2024-05-23 ASSESSMENT — PAIN DESCRIPTION - LOCATION
LOCATION: HIP

## 2024-05-23 ASSESSMENT — ACTIVITIES OF DAILY LIVING (ADL): HOME_MANAGEMENT_TIME_ENTRY: 40

## 2024-05-23 NOTE — CARE PLAN
The patient's goals for the shift include      The clinical goals for the shift include Pt will have pain remain at a managable level this shift.    Pt was safely discharged to home.

## 2024-05-23 NOTE — NURSING NOTE
Discharge teaching completed, voiced understanding. Taken via w/c at this time to private vehicle.  Vitals stable, see flow sheet.

## 2024-05-23 NOTE — PROGRESS NOTES
Physical Therapy    Physical Therapy    Physical Therapy Treatment    Patient Name: Vanna Bhakta  MRN: [id number]  Today's Date: 5/23/2024  Time Calculation  Start Time: 0922  Stop Time: 0954  Time Calculation (min): 32 min       Assessment/Plan   PT Assessment  PT Assessment Results: Decreased strength, Decreased range of motion, Decreased endurance, Impaired balance, Decreased mobility, Pain, Orthopedic restrictions  End of Session Communication: Bedside nurse  End of Session Patient Position: Up in chair  PT Plan  Inpatient/Swing Bed or Outpatient: Inpatient  PT Plan  Treatment/Interventions: Bed mobility, Transfer training, Gait training, Balance training, Neuromuscular re-education, Strengthening, Endurance training, Range of motion, Therapeutic exercise, Therapeutic activity, Home exercise program, Stair training  PT Plan: Skilled PT  PT Frequency: BID  PT Discharge Recommendations: Low intensity level of continued care  Equipment Recommended upon Discharge: Wheeled walker  PT Recommended Transfer Status: Assist x1  PT - OK to Discharge: Yes ()     05/23/24 0922   PT  Visit   PT Received On 05/23/24   Response to Previous Treatment Patient with no complaints from previous session.   General   Reason for Referral R THR therex gait aand transfer training   Pain Assessment   Pain Assessment 0-10   Pain Score 6   Pain Type Surgical pain   Pain Location Hip   Pain Orientation Right   Pain Interventions Cold applied   Cognition   Overall Cognitive Status WFL   Therapeutic Exercise   Therapeutic Exercise Performed Yes   Therapeutic Exercise Activity 1 AP,  QS,  GS,  Hip Abduction,  Marchin,  LAQ,  Ball Squeezes  x 20 B with emphasis on R LE   Bed Mobility   Bed Mobility Yes   Ambulation/Gait Training   Ambulation/Gait Training Performed Yes   Ambulation/Gait Training 1   Surface 1 Level tile   Device 1 Rolling walker   Gait Support Devices Gait belt   Assistance 1 Contact guard   Quality of Gait 1 WBOS;Decreased  step length   Comments/Distance (ft) 1 100 x 2   Transfers   Transfer Yes   Transfer 1   Technique 1 Sit to stand;Stand to sit   Transfer Device 1 Walker   Transfer Level of Assistance 1 Contact guard  (slow to transition. flexed at the hips needs cues to stand erext  shifts weight anteriorly while ambulating)   Stairs   Stairs No   PT Assessment   PT Assessment Results Decreased strength;Decreased range of motion;Decreased endurance;Impaired balance;Decreased mobility;Pain;Orthopedic restrictions   End of Session Communication Bedside nurse   End of Session Patient Position Up in chair   PT Plan   Inpatient/Swing Bed or Outpatient Inpatient     Outcome Measures:  [facility] Basic Mobility  Turning from your back to your side while in a flat bed without using bedrails: A little  Moving from lying on your back to sitting on the side of a flat bed without using bedrails: A little  Moving to and from bed to chair (including a wheelchair): A little  Standing up from a chair using your arms (e.g. wheelchair or bedside chair): A little  To walk in hospital room: A little  Climbing 3-5 steps with railing: A lot  Basic Mobility - Total Score: 17                             EDUCATION:     Education Documentation  No documentation found.  Education Comments  No comments found.        GOALS:  Encounter Problems       Encounter Problems (Active)       PT Problem       Pt will perform bed mobility with mod I.  (Progressing)       Start:  05/22/24    Expected End:  06/05/24            Pt will complete sit <> stand and bed <> chair transfers with mod I.  (Progressing)       Start:  05/22/24    Expected End:  06/05/24            Pt will ambulate 300 feet mod I using FWW with no significant gait deviations.   (Progressing)       Start:  05/22/24    Expected End:  06/05/24            Pt will verbalize and demonstrate understanding of JOVANA supine/seated exercises.  (Progressing)       Start:  05/22/24    Expected End:  06/05/24             Pt will ascend/descend at least one stair in order to navigate home environment.   (Progressing)       Start:  05/22/24    Expected End:  06/05/24                   Pain - Adult

## 2024-05-23 NOTE — PROGRESS NOTES
Occupational Therapy    OT Treatment    Patient Name: Vanna Bhakta  MRN: [id number]  Today's Date: 5/23/2024  Time Calculation  Start Time: 0935  Stop Time: 1015  Time Calculation (min): 40 min         Assessment:  OT Assessment: Patient tolerated session fairly well but was quite fatigued and painful at end of session. Patient could benefit from continued OT services to increase independence in ADL tasks, safety with walker,       Plan:  OT Frequency: Daily     Subjective     Current Problem:  Patient Active Problem List   Diagnosis    Status post right hip replacement       General:  OT Received On: 05/23/24  Reason for Referral: R THR therex gait aand transfer training  Patient Position Received:  (Patient was walking back into her room after working with physical therapy.  Patient agreeable to working with O.T.)  General Comment: Patient agreeable to working with O.T.    Vital Signs:       Pain:  Pain Assessment  Pain Assessment: 0-10  Pain Score: 6  Pain Type: Surgical pain  Pain Location: Hip  Pain Orientation: Right  Objective      Activities of Daily Living:    Grooming  Grooming Level of Assistance: Close supervision  Grooming Where Assessed: Standing sinkside  Grooming Comments: Verbal cues for safe walker placement at sink.  Patient stood for ADL task, no assist.   Walk into bathroom using walker.  Patient moves slowly at this time secondary to surgical pain.              Toileting  Toileting Level of Assistance: Distant supervision  Where Assessed: Toilet  Toileting Comments: Patient slowly turned and sat on toilet with bedside commode frame placed over it.  Patient stated she has a raised toilet seat at home plus additional equipment. (Patient was able to manage hygiene after urnating.)    Functional Standing Tolerance:       Bed Mobility/Transfers: Bed Mobility  Bed Mobility: Yes  Bed Mobility 1  Bed Mobility 1: Sitting to supine  Level of Assistance 1: Minimum assistance  Bed Mobility Comments 1:  Patient was struggling to get her legs into bed.  Handed patient the gait belt to use to assist with her R leg.  Patient knew how to use he gait belt as a leg , but was not able to get her leg on the mattress, requiring assist. Patient needed assist moving her R leg over in bed.  Transfers  Transfer: Yes  Transfer 1  Transfer From 1: Sit to  Transfer to 1: Stand  Trials/Comments 1: No cues needed for safe hand placement sit to stand/stand to sit using walker.  Toilet Transfers  Toilet Transfers Comments: Verbal cues needed for safe transfer to the toilet. Educated patient in walker safety.             Therapy/Activity:               Strength:       Other Activity:       Outcome Measures:[facility] Daily Activity  Putting on and taking off regular lower body clothing: A little  Bathing (including washing, rinsing, drying): A little  Putting on and taking off regular upper body clothing: A little  Toileting, which includes using toilet, bedpan or urinal: A little  Taking care of personal grooming such as brushing teeth: None  Eating Meals: None  Daily Activity - Total Score: 20  Education Documentation  No documentation found.  Education Comments  No comments found.        EDUCATION:  Education  Individual(s) Educated: Patient  Education Provided: Fall precautons, Risk and benefits of OT discussed with patient or other  Plan of Care Discussed and Agreed Upon: yes  Patient Response to Education: Patient/Caregiver Verbalized Understanding of Information    Goals:  Encounter Problems       Encounter Problems (Active)       OT Goals       Patient will complete functional transfers with mod I. (Progressing)       Start:  05/22/24    Expected End:  06/05/24            Patient will complete LE dressing with mod I following hip precautions. (Progressing)       Start:  05/22/24    Expected End:  06/05/24            Patient will complete toileting with mod I. (Progressing)       Start:  05/22/24    Expected End:  06/05/24

## 2024-05-23 NOTE — DISCHARGE SUMMARY
Discharge Diagnosis  Status post right hip replacement      Discharge summary      This patient Vanna Bhakta was admitted to the hospital on 5/22/2024  after undergoing Procedure(s) (LRB):  RIGHT TOTAL HIP REPLACEMENT (Right) without complications.      No significant or unexpected findings or abnormal ortho imaging were noted during the hospital stay    Hospital course      Patient tolerated surgical procedure well and there was no complications. Patient progressed adequately through their recovery during hospital stay including PT and rehabilitation.    Patient was then D/C on 5/23/2024 to home  in stable condition.  Patient was instructed on the use of pain medications, the signs and symptoms of infection, and was given our number to call should they have any questions or concerns following discharge.    Based on my clinical judgment, the patient was provided with a 7-day prescription for opioid medication at 30 MED, indicated for treatment of acute pain in the setting of recent right JOVANA. OARRS report was run and has demonstrated an appropriate time course.  The patient has been provided with counseling pertaining to safe use of opioid medication.    Pertinent Physical Exam At Time of Discharge  Alert, oriented x 3, cooperative with examination.     Examination of the right lower  extremity reveals right hip  dressing is intact without drainage.  No elysia-incisional ecchymosis.  EHL, plantarflexion, dorsiflexion are 4+/5.  Sensory intact light touch in the deep/superficial/common peroneal, saphenous, tibial, sural nerve distributions.  DP and popliteal pulses are 2+ with capillary refill less than 2 seconds.        Home Medications     Medication List      START taking these medications     aspirin 81 mg EC tablet; Take 1 tablet (81 mg) by mouth 2 times a day.   HYDROcodone-acetaminophen 5-325 mg tablet; Commonly known as: Norco;   Take 1 tablet by mouth every 6 hours if needed for moderate pain (4 - 6)    or severe pain (7 - 10) for up to 7 days. Limit acetaminophen to less than   3000mg per 24 hours     CHANGE how you take these medications     meloxicam 15 mg tablet; Commonly known as: Mobic; Take 1 tablet (15 mg)   by mouth once daily for 15 days.; What changed: when to take this, reasons   to take this     CONTINUE taking these medications     COLLAGEN SKIN RENEWAL ORAL   multivitamin tablet   PRESERVISION AREDS-2 ORAL     STOP taking these medications     chlorhexidine 0.12 % solution; Commonly known as: Peridex   glucosamine-chondroitin 500-400 mg tablet   LUTEIN ORAL           Patient may bear weight as tolerated to operative extremity with use of walker for assistance with ambulation   Mepilex dressing to be removed pod7 and incision & new X be placed over incision for another 7 days and then removed  Aspirin 81 mg for DVT prophylaxis started on 5/23 and to be taken for 30 days  Follow up with surgeon in 2 weeks          Outpatient Follow-Up  No future appointments.      Jossy Jara PA-C

## 2024-05-23 NOTE — PROGRESS NOTES
Physical Therapy    Physical Therapy    Physical Therapy Treatment    Patient Name: Vanna Bhakta  MRN: [id number]  Today's Date: 5/23/2024  Time Calculation  Start Time: 1202  Stop Time: 1240  Time Calculation (min): 38 min       Assessment/Plan   PT Assessment  PT Assessment Results: Decreased strength, Decreased range of motion, Decreased endurance, Impaired balance, Decreased mobility, Pain, Orthopedic restrictions  End of Session Communication: Bedside nurse  End of Session Patient Position: Up in chair, Alarm on  PT Plan  Inpatient/Swing Bed or Outpatient: Inpatient  PT Plan  Treatment/Interventions: Bed mobility, Transfer training, Gait training, Balance training, Neuromuscular re-education, Strengthening, Endurance training, Range of motion, Therapeutic exercise, Therapeutic activity, Home exercise program, Stair training  PT Plan: Skilled PT  PT Frequency: BID  PT Discharge Recommendations: Low intensity level of continued care  Equipment Recommended upon Discharge: Wheeled walker  PT Recommended Transfer Status: Assist x1  PT - OK to Discharge: Yes (     05/23/24 1202   PT  Visit   PT Received On 05/23/24   Response to Previous Treatment Patient with no complaints from previous session.   General   Reason for Referral R THR therex gait aand transfer training   General Comment Much improved mobility this pm   Pain Assessment   Pain Assessment 0-10   Pain Score 3   Pain Type Surgical pain   Pain Location Hip   Pain Orientation Right   Pain Interventions Cold applied   Cognition   Overall Cognitive Status WFL   Therapeutic Exercise   Therapeutic Exercise Performed Yes   Therapeutic Exercise Activity 1 AP,  QS,  GS,  Hip Abduction,  Marchin,  LAQ,  Ball Squeezes  x 20 B with emphasis on R LE   Bed Mobility   Bed Mobility Yes   Bed Mobility 1   Bed Mobility 1 Supine to sitting;Sitting to supine   Level of Assistance 1 Contact guard   Bed Mobility Comments 1 instructed in use of gait belt as leg  will  have family to assist as needed   Ambulation/Gait Training   Ambulation/Gait Training Performed Yes   Ambulation/Gait Training 1   Surface 1 Level tile   Device 1 Rolling walker   Gait Support Devices Gait belt   Assistance 1 Contact guard;Close supervision   Comments/Distance (ft) 1 300  (Patient dmeos reciprocating steps with good heel strike toe off pattern)   Transfers   Transfer Yes   Transfer 1   Technique 1 Sit to stand;Stand to sit   Transfer Device 1 Walker   Transfer Level of Assistance 1 Contact guard;Close supervision   Trials/Comments 1 x3   Stairs   Stairs Yes   Stairs   Rails 1 Left   Device 1 Single point cane   Support Devices 1 Gait belt   Assistance 1 Contact guard;Minimum assistance   Comment/Number of Steps 1 x3   PT Assessment   End of Session Communication Bedside nurse   End of Session Patient Position Up in chair;Alarm on   PT Plan   Inpatient/Swing Bed or Outpatient Inpatient     Outcome Measures:  [facility] Basic Mobility  Turning from your back to your side while in a flat bed without using bedrails: A little  Moving from lying on your back to sitting on the side of a flat bed without using bedrails: A little  Moving to and from bed to chair (including a wheelchair): A little  Standing up from a chair using your arms (e.g. wheelchair or bedside chair): A little  To walk in hospital room: A little  Climbing 3-5 steps with railing: A lot  Basic Mobility - Total Score: 17                             EDUCATION:     Education Documentation  No documentation found.  Education Comments  No comments found.        GOALS:  Encounter Problems       Encounter Problems (Active)       PT Problem       Pt will perform bed mobility with mod I.  (Progressing)       Start:  05/22/24    Expected End:  06/05/24            Pt will complete sit <> stand and bed <> chair transfers with mod I.  (Progressing)       Start:  05/22/24    Expected End:  06/05/24            Pt will ambulate 300 feet mod I using FWW with  no significant gait deviations.   (Progressing)       Start:  05/22/24    Expected End:  06/05/24            Pt will verbalize and demonstrate understanding of JOVANA supine/seated exercises.  (Progressing)       Start:  05/22/24    Expected End:  06/05/24            Pt will ascend/descend at least one stair in order to navigate home environment.   (Progressing)       Start:  05/22/24    Expected End:  06/05/24                   Pain - Adult

## 2024-05-23 NOTE — PROGRESS NOTES
"Vanna Bhakta is a 69 y.o. female presenting with Status post right hip replacement.    Subjective   Ms. Bhakta is sitting up in her chair.  She complains of 9/10 right hip discomfort, but is due for pain medication.  She denies any chest pain, shortness of breath, nausea.  She is voiding without issues.       Objective     Physical Exam  Vitals reviewed.   HENT:      Head: Normocephalic.      Mouth/Throat:      Mouth: Mucous membranes are moist.      Pharynx: Oropharynx is clear.   Eyes:      Extraocular Movements: Extraocular movements intact.   Cardiovascular:      Rate and Rhythm: Normal rate.   Pulmonary:      Effort: Pulmonary effort is normal.   Abdominal:      Palpations: Abdomen is soft.   Musculoskeletal:      Comments: right hip dressing is dry and intact.  light touch sensation is intact, ankle dorsiflexion/plantar flexion is intact. DP 2+/2 palpable     Skin:     General: Skin is warm and dry.      Capillary Refill: Capillary refill takes less than 2 seconds.   Neurological:      General: No focal deficit present.      Mental Status: She is alert. Mental status is at baseline.   Psychiatric:         Mood and Affect: Mood normal.         Last Recorded Vitals  Blood pressure 140/59, pulse 109, temperature 37 °C (98.6 °F), temperature source Temporal, resp. rate 18, height 1.727 m (5' 8\"), weight 86.2 kg (190 lb 0.6 oz), SpO2 94%.  Intake/Output last 3 Shifts:  I/O last 3 completed shifts:  In: 2359.2 (27.4 mL/kg) [P.O.:240; I.V.:19.2 (0.2 mL/kg); IV Piggyback:2100]  Out: - (0 mL/kg)   Weight: 86.2 kg     Relevant Results      Scheduled medications  aspirin, 81 mg, oral, BID  ferrous sulfate (325 mg ferrous sulfate), 65 mg of iron, oral, Daily  multivitamin with minerals, 1 tablet, oral, Daily  polyethylene glycol, 17 g, oral, Daily      Continuous medications  lactated Ringer's, 100 mL/hr, Last Rate: 100 mL/hr (05/22/24 0727)  lactated Ringer's, 100 mL/hr, Last Rate: Stopped (05/23/24 " 0715)  oxygen, 2 L/min, Last Rate: Stopped (05/22/24 1215)      PRN medications  PRN medications: cyclobenzaprine, diphenhydrAMINE, meloxicam, morphine, naloxone, oxyCODONE, oxyCODONE  Results for orders placed or performed during the hospital encounter of 05/22/24 (from the past 24 hour(s))   CBC   Result Value Ref Range    WBC 11.8 (H) 4.4 - 11.3 x10*3/uL    nRBC 0.0 0.0 - 0.0 /100 WBCs    RBC 3.79 (L) 4.00 - 5.20 x10*6/uL    Hemoglobin 11.3 (L) 12.0 - 16.0 g/dL    Hematocrit 35.1 (L) 36.0 - 46.0 %    MCV 93 80 - 100 fL    MCH 29.8 26.0 - 34.0 pg    MCHC 32.2 32.0 - 36.0 g/dL    RDW 13.8 11.5 - 14.5 %    Platelets 196 150 - 450 x10*3/uL   Basic Metabolic Panel   Result Value Ref Range    Glucose 122 (H) 74 - 99 mg/dL    Sodium 134 (L) 136 - 145 mmol/L    Potassium 3.8 3.5 - 5.3 mmol/L    Chloride 103 98 - 107 mmol/L    Bicarbonate 25 21 - 32 mmol/L    Anion Gap 10 10 - 20 mmol/L    Urea Nitrogen 8 6 - 23 mg/dL    Creatinine 0.66 0.50 - 1.05 mg/dL    eGFR >90 >60 mL/min/1.73m*2    Calcium 9.1 8.6 - 10.3 mg/dL                      Assessment/Plan   Principal Problem:    Status post right hip replacement    POD #1 s/p right  JOVANA  Continue PT/OT, WBAT right   LE  Using incentive spirometer   Reviewed am labs  Multimodal pain regimen  Surgical hip dressing to be removed on post op day #7  VTE prophylaxis: aspirin BID  When appropriate, will discharge home with [facility]  Follow up with Dr. Hoffman in 2 weeks         I spent 25 minutes in the professional and overall care of this patient.      Jossy Jara PA-C    Patient seen and examined this morning.  Agree with above.  Ok for discharge home.  ASA for DVT prohylaxis.  Home PT.  FU my office in 2 - 3 weeks.

## 2024-05-23 NOTE — PROGRESS NOTES
05/23/24 1436   Discharge Planning   Living Arrangements Alone   Type of Residence Private residence   Number of Stairs to Enter Residence 1   Number of Stairs Within Residence 0   Home or Post Acute Services In home services   Type of Home Care Services Home OT;Home PT   Patient expects to be discharged to: Home   Does the patient need discharge transport arranged? No   Financial Resource Strain   How hard is it for you to pay for the very basics like food, housing, medical care, and heating? Not hard   Transportation Needs   In the past 12 months, has lack of transportation kept you from medical appointments or from getting medications? no   Patient Choice   Patient / Family choosing to utilize agency / facility established prior to hospitalization Yes  ([facility])     Met with pt at bedside to review dc plan. Independent prior to admit. Pt lives home alone in a ranch style home. One step leading into the home. Pt owns a walker. Pt's niece will be staying with her for at least one night, possibly more if needed. PCP is Dr. Boggs and prescriptions are filled at [facility] with no barriers noted. Pt denies difficulty with living expenses/utilities/groceries/prescriptions. Has a ride home upon dc. Has chosen to utilize [facility] for her HC needs, was established prior to admit. Has their contact info at home, plans to call when she gets home. [facility] 18.

## 2024-05-23 NOTE — CARE PLAN
Problem: Pain  Goal: Takes deep breaths with improved pain control throughout the shift  Outcome: Progressing  Goal: Turns in bed with improved pain control throughout the shift  Outcome: Progressing  Goal: Walks with improved pain control throughout the shift  Outcome: Progressing  Goal: Performs ADL's with improved pain control throughout shift  Outcome: Progressing  Goal: Participates in PT with improved pain control throughout the shift  Outcome: Progressing  Goal: Free from opioid side effects throughout the shift  Outcome: Progressing  Goal: Free from acute confusion related to pain meds throughout the shift  Outcome: Progressing     Problem: Pain - Adult  Goal: Verbalizes/displays adequate comfort level or baseline comfort level  Outcome: Progressing     Problem: Safety - Adult  Goal: Free from fall injury  Outcome: Progressing     Problem: Discharge Planning  Goal: Discharge to home or other facility with appropriate resources  Outcome: Progressing     Problem: Chronic Conditions and Co-morbidities  Goal: Patient's chronic conditions and co-morbidity symptoms are monitored and maintained or improved  Outcome: Progressing     Problem: Fall/Injury  Goal: Not fall by end of shift  Outcome: Progressing  Goal: Be free from injury by end of the shift  Outcome: Progressing  Goal: Verbalize understanding of personal risk factors for fall in the hospital  Outcome: Progressing  Goal: Verbalize understanding of risk factor reduction measures to prevent injury from fall in the home  Outcome: Progressing  Goal: Use assistive devices by end of the shift  Outcome: Progressing  Goal: Pace activities to prevent fatigue by end of the shift  Outcome: Progressing     Problem: Meds/Post-op Pain  Goal: Pain controlled to tolerate pain level  Outcome: Progressing  Goal: Tolerates prescribed medication  Outcome: Progressing     Problem: DVT/VTE Prevention/Activity  Goal: No decrease in circulation/sensation  Outcome:  Progressing  Goal: Prevent skin breakdown  Outcome: Progressing  Goal: Return to preop oxygenation status  Outcome: Progressing  Goal: Tolerates optimal activity  Outcome: Progressing  Goal: Increase self care and/or family involvement in 24 hrs.  Outcome: Progressing     Problem: Wound care/infection prevention  Goal: No signs of infection in 24 hrs.  Outcome: Progressing  Goal: No unexpected bleeding from incision this shift  Outcome: Progressing     Problem: Diet/fluid balance  Goal: Adequate urinary output  Outcome: Progressing  Goal: Free from nausea/vomiting  Outcome: Progressing  Goal: Return in bowel function  Outcome: Progressing  Goal: Tolerates prescribed diet  Outcome: Progressing     Problem: Other goals  Goal: No change in neurological status  Outcome: Progressing  Goal: Stabilize vital signs (return to 10% of baseline)  Outcome: Progressing     Problem: Hip Replacement Intial Post Op  Goal: Activity/Mobility Safety  Outcome: Progressing  Goal: Treatment  Outcome: Progressing     Problem: Hip Replacement Post Op Day 1  Goal: Activity/Mobility Safety  Outcome: Progressing  Goal: Treatment  Outcome: Progressing   The patient's goals for the shift include      The clinical goals for the shift include Pt will ambulate with minimal assistance, have minimal pain this shift and receive adequate rest.    Over the shift, the patient did not receive adequate rest.

## 2024-09-30 ENCOUNTER — CLINICAL SUPPORT (OUTPATIENT)
Dept: AUDIOLOGY | Facility: CLINIC | Age: 69
End: 2024-09-30
Payer: MEDICARE

## 2024-09-30 ENCOUNTER — APPOINTMENT (OUTPATIENT)
Dept: OTOLARYNGOLOGY | Facility: CLINIC | Age: 69
End: 2024-09-30
Payer: COMMERCIAL

## 2024-09-30 VITALS
WEIGHT: 173 LBS | DIASTOLIC BLOOD PRESSURE: 81 MMHG | TEMPERATURE: 96.5 F | HEIGHT: 68 IN | SYSTOLIC BLOOD PRESSURE: 138 MMHG | BODY MASS INDEX: 26.22 KG/M2

## 2024-09-30 DIAGNOSIS — H93.13 TINNITUS OF BOTH EARS: ICD-10-CM

## 2024-09-30 DIAGNOSIS — H90.3 SENSORINEURAL HEARING LOSS (SNHL) OF BOTH EARS: ICD-10-CM

## 2024-09-30 DIAGNOSIS — H93.13 TINNITUS OF BOTH EARS: Primary | ICD-10-CM

## 2024-09-30 DIAGNOSIS — H90.3 BILATERAL SENSORINEURAL HEARING LOSS: Primary | ICD-10-CM

## 2024-09-30 PROCEDURE — 1036F TOBACCO NON-USER: CPT | Performed by: OTOLARYNGOLOGY

## 2024-09-30 PROCEDURE — 92550 TYMPANOMETRY & REFLEX THRESH: CPT | Performed by: AUDIOLOGIST

## 2024-09-30 PROCEDURE — 92557 COMPREHENSIVE HEARING TEST: CPT | Performed by: AUDIOLOGIST

## 2024-09-30 PROCEDURE — 1159F MED LIST DOCD IN RCRD: CPT | Performed by: OTOLARYNGOLOGY

## 2024-09-30 PROCEDURE — 1160F RVW MEDS BY RX/DR IN RCRD: CPT | Performed by: OTOLARYNGOLOGY

## 2024-09-30 PROCEDURE — 3008F BODY MASS INDEX DOCD: CPT | Performed by: OTOLARYNGOLOGY

## 2024-09-30 PROCEDURE — 99203 OFFICE O/P NEW LOW 30 MIN: CPT | Performed by: OTOLARYNGOLOGY

## 2024-09-30 RX ORDER — HYDROCODONE/ACETAMINOPHEN 5 MG-500MG
TABLET ORAL
COMMUNITY

## 2024-09-30 ASSESSMENT — PAIN SCALES - GENERAL: PAINLEVEL_OUTOF10: 0 - NO PAIN

## 2024-09-30 ASSESSMENT — ENCOUNTER SYMPTOMS: OCCASIONAL FEELINGS OF UNSTEADINESS: 0

## 2024-09-30 ASSESSMENT — PAIN - FUNCTIONAL ASSESSMENT: PAIN_FUNCTIONAL_ASSESSMENT: 0-10

## 2024-09-30 NOTE — PROGRESS NOTES
Impression:  1. Bilateral sensorineural hearing loss        2. Tinnitus of both ears             RECOMMENDATIONS/PLAN :  I reassured the patient that medically her ears look excellent and there is no evidence of any cerumen or infection today.  I recommend she continue to use background noise to help cover up the ringing and we will repeat an audiogram over the next couple of years or sooner with any sudden changes.      **This electronic medical record note was created with the use of voice recognition software.  Despite proofreading, typographical or grammatical errors may be present that could affect meaning of content **    Subjective   Patient ID:     Vanna Bhakta is a 69 y.o. female who presents to the office today after she has noticed a nonpulsating type ringing in her ears.  She denies any trauma to the head neck or ears.  She has not had any spinning or vertigo or any other neurologic complaints.  She denies any history of middle ear infections or drainage from the ears.    ROS:  A detailed 12 system review of systems is noted on the intake form has been reviewed with the patient with details noted in the HPI and scanned into the patient's medical record.    Objective     Past Medical History:   Diagnosis Date    Cataracts, bilateral     Osteoarthritis         Past Surgical History:   Procedure Laterality Date    FINGER SURGERY      HIP ARTHROPLASTY Left         No Known Allergies       Current Outpatient Medications:     ascorbic acid/collagen hydr (COLLAGEN SKIN RENEWAL ORAL), Take by mouth once daily. 1Tablespoonful, Disp: , Rfl:     lutein 6 mg capsule, Take by mouth., Disp: , Rfl:     multivitamin tablet, Take 1 tablet by mouth once daily., Disp: , Rfl:     vit C/E/Zn/coppr/lutein/zeaxan (PRESERVISION AREDS-2 ORAL), Take 1 tablet by mouth 2 times a day., Disp: , Rfl:      Tobacco Use: Medium Risk (9/30/2024)    Patient History     Smoking Tobacco Use: Former     Smokeless Tobacco Use: Never      "Passive Exposure: Not on file        Alcohol Use: Not At Risk (5/22/2024)    AUDIT-C     Frequency of Alcohol Consumption: 2-4 times a month     Average Number of Drinks: 1 or 2     Frequency of Binge Drinking: Never        Social History     Substance and Sexual Activity   Drug Use Never        Physical Exam:  Visit Vitals  /81   Temp 35.8 °C (96.5 °F) (Temporal)   Ht 1.727 m (5' 8\")   Wt 78.5 kg (173 lb)   BMI 26.30 kg/m²   Smoking Status Former   BSA 1.94 m²      General: Patient is alert, oriented, cooperative in no apparent distress.  Head: Normocephalic, atraumatic.  Eyes: PERRL, EOMI, Conjunctiva is clear. No nystagmus.  Ears: Right Ear-- Pinna is normal.  External auditory canal is patent. Tympanic membrane is [intact, translucent and has good mobility with my pneumatic otoscope. No effusion].  Mastoid is nontender.  Left ear-- Pinna is normal.  External auditory canal is patent. Tympanic membrane is [intact, translucent and has good mobility with my pneumatic otoscope.  No effusion].  Mastoid is nontender.  Nose: Septum is straight.  No septal perforation or lesions. No septal hematoma/ seroma.  No signs of bleeding.  Inferior turbinates are mildly swollen.   No evidence of intranasal polyps.  No infectious drainage.  Throat:  Floor of mouth is clear, no masses.  Tongue appears normal, no lesions or masses. Gums, gingiva, buccal mucosa appear pink and moist, no lesions. Teeth are in good repair.  No obvious dental infections.  Peritonsillar regions appear symmetric without swelling.  Hard and soft palate appear normal, no obvious cleft. Uvula is midline.  Oropharynx: No lesions. Retropharyngeal wall is flat.  No active postnasal drip.  Neck: Supple,  no lymphadenopathy.  No masses.  Salivary Glands: Symmetric bilaterally.  No palpable masses.  No evidence of acute infection or salivary stones  Neurologic: Cranial Nerves 2-12 are grossly intact without focal deficits. Cerebellar function testing is " normal.     Results:   I reviewed her recent audiogram and she does have a bilateral high-frequency sensorineural loss in both ears.  Tympanic membrane's have normal mobility and tympanometry.  Word recognition scores 96% in the right ear and 100% in the left ear.  Speech reception threshold is 30 dB in the right ear and 15 dB in the left ear    Procedure:   []    Austin Barbosa, DO

## 2024-09-30 NOTE — PROGRESS NOTES
AUDIOLOGY ADULT AUDIOMETRIC EVALUATION      Name:  Vanna Bhakta  :  1955  Age:  69 y.o.  Date of Evaluation: 24    History:  Reason for visit:  Ms. Vanna Bhakta was seen today as part of the visit with Austin Barbosa D.O. for an evaluation of hearing.   Chief Complaint   Patient presents with    Tinnitus     Reported approximately two months ago she noticed a sudden onset of constant bilateral non-pulsatile non-bothersome tinnitus. Reported the tinnitus has been perceived as a white noise type sound. Denied any difficulty sleeping or communicating due to the tinnitus. Denied any negative thoughts, depression or anxiety due to the tinnitus.  Denied any difficulty performing daily living activities due to the tinnitus.   Stated a few weeks ago she did experience a sinus cold as well as a loss of her sense of taste. Mentioned her taste has been slowly improving.   She has been able to hear and communicate without significant difficulty, however, stated at times she has been concerned her boyfriend does mumble.   Denied any otalgia, aural fullness, ear pressure, dizziness/vertigo, ear surgery, recent ear/sinus infections, recent falls, significant noise exposure, sinus/throat concerns, ear drainage, or sudden hearing loss.    EVALUATION:     See Audiogram    RESULTS:    Otoscopic Evaluation:   Right Ear: Otoscopy revealed a clear healthy canal and a healthy tympanic membrane was visualized.   Left Ear: Otoscopy revealed a clear healthy canal and a healthy tympanic membrane was visualized.     Immittance:  Immittance Measures: 226 Hz   Right Ear: Tympanometric testing revealed a normal type A tympanogram with normal middle ear pressure and normal static compliance.  Left Ear: Tympanometric testing revealed a normal type A tympanogram with normal middle ear pressure and normal static compliance.    Right: Ipsilateral acoustic reflexes were present at, 500-4,000 Hz, at expected sensation  levels.  Left Ear: Ipsilateral acoustic reflexes were present at, 500-4,000 Hz, at expected sensation levels.      Test technique:  Pure Tone Audiometry via insert earphones    Reliability:   excellent    Pure Tone Audiometry:    Right Ear: Audiometric testing indicated normal peripheral hearing sensitivity through 1,000 Hz, sloping to a mild to moderate sensorineural hearing loss above.   Left Ear:   Audiometric testing indicated normal peripheral hearing sensitivity through 1,000 Hz, sloping to a mild sensorineural hearing loss above.       Speech Audiometry:   Right Ear:  Speech Reception Threshold (SRT) was obtained at 30 dBHL                  Word Recognition scores were excellent (96%) in quiet when words were presented at 65 dBHL  Left Ear:  Speech Reception Threshold (SRT) was obtained at  15 dBHL                  Word Recognition scores were excellent (100%) in quiet when words were presented at 65 dBHL  Testing was performed with recorded NU-6 speech words in quiet. Speech thresholds were in good agreement with the pure tone averages in each ear.     IMPRESSIONS:  Today's test results are  consistent with a mild to moderate sensorineural hearing loss, bilaterally .  The patient was counseled with regard to the findings.    Amplification needs:  Hearing aids were should be considered due to the hearing loss and the tinnitus.    RECOMMENDATIONS:  * Continue medical follow up with Austin Barbosa D.O.  * Retest as medically indicated, or sooner if a change in hearing sensitivity or tinnitus is noticed.   * Wear hearing protection while in the presence of loud sounds.   * Use tinnitus coping strategies as needed, such as sound apps on a smart phone, utilizing calming noise in the room, running a fan at night, etc.   * Pending medical management and patient desire, consider returning for a hearing aid evaluation to discuss amplification options and communication needs. The patient was instructed to call their  insurance to check for any hearing aid benefits and to determine if [facility] was in network for hearing aids.   * Use effective communication strategies such as asking the speaker to gain attention prior to speaking, speaking in the same room, repeating words that were heard, etc.    PATIENT EDUCATION:   Discussed results and recommendations with the patient and a copy of the hearing test was provided.  Questions were addressed and the patient was encouraged to contact our department should concerns arise.  The patient was seen from  8:00-8:30 am.

## 2025-06-26 ENCOUNTER — HOSPITAL ENCOUNTER (OUTPATIENT)
Dept: RADIOLOGY | Facility: HOSPITAL | Age: 70
Discharge: HOME | End: 2025-06-26
Payer: MEDICARE

## 2025-06-26 VITALS — BODY MASS INDEX: 26.22 KG/M2 | WEIGHT: 173 LBS | HEIGHT: 68 IN

## 2025-06-26 DIAGNOSIS — Z13.820 ENCOUNTER FOR SCREENING FOR OSTEOPOROSIS: ICD-10-CM

## 2025-06-26 DIAGNOSIS — Z78.0 ASYMPTOMATIC MENOPAUSAL STATE: ICD-10-CM

## 2025-06-26 DIAGNOSIS — Z12.31 ENCOUNTER FOR SCREENING MAMMOGRAM FOR MALIGNANT NEOPLASM OF BREAST: ICD-10-CM

## 2025-06-26 PROCEDURE — 77067 SCR MAMMO BI INCL CAD: CPT | Performed by: RADIOLOGY

## 2025-06-26 PROCEDURE — 77080 DXA BONE DENSITY AXIAL: CPT | Performed by: RADIOLOGY

## 2025-06-26 PROCEDURE — 77080 DXA BONE DENSITY AXIAL: CPT

## 2025-06-26 PROCEDURE — 77063 BREAST TOMOSYNTHESIS BI: CPT | Performed by: RADIOLOGY

## 2025-06-26 PROCEDURE — 77067 SCR MAMMO BI INCL CAD: CPT

## (undated) DEVICE — SOLUTION, IRRIGATION, SODIUM CHLORIDE 0.9%, 1000 ML, POUR BOTTLE

## (undated) DEVICE — DRAPE, SHEET, U, STERI DRAPE, 47 X 51 IN, DISPOSABLE, STERILE

## (undated) DEVICE — DRESSING, MEPILEX BORDER, POST-OP AG, 4 X 10 IN

## (undated) DEVICE — HOOD, STERISHIELD T4 SYSTEM

## (undated) DEVICE — SUTURE, ETHIBOND, 2, CUSTOM, GREEN/WHITE

## (undated) DEVICE — SUTURE, STARTAFIX, SYMMETRIC, PDS PLUS, 60CM CT-1

## (undated) DEVICE — DRAINAGE SET, CLOSED WOUND, MED-LG, PVC, 3/16 IN, DAVOL, 15 FR, 400 CC

## (undated) DEVICE — SOLUTION, IRRIGATION, STERILE WATER, 1000 ML, POUR BOTTLE

## (undated) DEVICE — GLOVE, SURGICAL, PROTEXIS PI MICRO, 7.5, PF, LF

## (undated) DEVICE — SYRINGE, 60 CC, LUER LOCK, MONOJECT, W/O CAP, LF

## (undated) DEVICE — BANDAGE, COFLEX, 6 X 5 YDS, FOAM TAN, STERILE, LF

## (undated) DEVICE — PILLOW, ABDUCTION, MEDIUM

## (undated) DEVICE — NEEDLE, SPINAL, QUINCKE, 18 G X 3.5 IN, PINK HUB

## (undated) DEVICE — DRAPE, SHEET, THREE QUARTER, FAN FOLD, 57 X 77 IN

## (undated) DEVICE — BLADE, SAGITTAL, THIN, SHORT, LF

## (undated) DEVICE — DRAPE, INSTRUMENT, W/POUCH, STERI DRAPE, 7 X 11 IN, DISPOSABLE, STERILE

## (undated) DEVICE — IRRIGATION SYSTEM, WOUND, SURGIPHOR, 450ML, STERILE

## (undated) DEVICE — Device

## (undated) DEVICE — SUTURE, STRATAFIX, SPIRAL MONOCRYL PLUS, 3-0, PS-2 45CM, UNDYED

## (undated) DEVICE — SUTURE, VICRYL, 2-0, 27 IN, CP-1, UNDYED

## (undated) DEVICE — GOWN, SURGICAL, NON-REINFORCED, XLARGE, LF

## (undated) DEVICE — DRILL BIT, 3.3 X 25MM, SINGLE USE

## (undated) DEVICE — GLOVE, SURGICAL, PROTEXIS PI , 7.5, PF, LF

## (undated) DEVICE — TOWEL PACK, STERILE, 4/PACK, BLUE

## (undated) DEVICE — TISSUE ADHESIVE, PREMIERPRO EXOFIN, PRECISION PEN HV, 1.0ML